# Patient Record
Sex: FEMALE | Race: WHITE | Employment: PART TIME | URBAN - NONMETROPOLITAN AREA
[De-identification: names, ages, dates, MRNs, and addresses within clinical notes are randomized per-mention and may not be internally consistent; named-entity substitution may affect disease eponyms.]

---

## 2022-11-09 ENCOUNTER — APPOINTMENT (OUTPATIENT)
Dept: CT IMAGING | Age: 32
DRG: 816 | End: 2022-11-09
Payer: MEDICAID

## 2022-11-09 ENCOUNTER — APPOINTMENT (OUTPATIENT)
Dept: GENERAL RADIOLOGY | Age: 32
DRG: 816 | End: 2022-11-09
Payer: MEDICAID

## 2022-11-09 ENCOUNTER — HOSPITAL ENCOUNTER (INPATIENT)
Age: 32
LOS: 2 days | Discharge: HOME | DRG: 816 | End: 2022-11-11
Attending: EMERGENCY MEDICINE | Admitting: INTERNAL MEDICINE
Payer: MEDICAID

## 2022-11-09 DIAGNOSIS — F19.10 POLYSUBSTANCE ABUSE (HCC): ICD-10-CM

## 2022-11-09 DIAGNOSIS — T40.605A NARCOTIC-INDUCED RESPIRATORY DEPRESSION: Primary | ICD-10-CM

## 2022-11-09 DIAGNOSIS — R06.89 NARCOTIC-INDUCED RESPIRATORY DEPRESSION: Primary | ICD-10-CM

## 2022-11-09 DIAGNOSIS — D72.829 LEUKOCYTOSIS, UNSPECIFIED TYPE: ICD-10-CM

## 2022-11-09 DIAGNOSIS — R79.89 INCREASED AMMONIA LEVEL: ICD-10-CM

## 2022-11-09 PROBLEM — R53.83 LETHARGY: Status: ACTIVE | Noted: 2022-11-09

## 2022-11-09 PROBLEM — T40.2X1A OPIOID OVERDOSE, ACCIDENTAL OR UNINTENTIONAL, INITIAL ENCOUNTER (HCC): Status: ACTIVE | Noted: 2022-11-09

## 2022-11-09 LAB
ACETAMINOPHEN LEVEL: < 5 UG/ML (ref 0–20)
ALBUMIN SERPL-MCNC: 4.5 G/DL (ref 3.5–5.1)
ALLEN TEST: POSITIVE
ALP BLD-CCNC: 89 U/L (ref 38–126)
ALT SERPL-CCNC: 16 U/L (ref 11–66)
AMMONIA: 70 UMOL/L (ref 11–60)
AMPHETAMINE+METHAMPHETAMINE URINE SCREEN: NEGATIVE
ANION GAP SERPL CALCULATED.3IONS-SCNC: 11 MEQ/L (ref 8–16)
APTT: 28.9 SECONDS (ref 22–38)
AST SERPL-CCNC: 30 U/L (ref 5–40)
BARBITURATE QUANTITATIVE URINE: NEGATIVE
BASE EXCESS (CALCULATED): -3.4 MMOL/L (ref -2.5–2.5)
BASE EXCESS MIXED: -1.3 MMOL/L (ref -2–3)
BASOPHILS # BLD: 0.3 %
BASOPHILS ABSOLUTE: 0.1 THOU/MM3 (ref 0–0.1)
BENZODIAZEPINE QUANTITATIVE URINE: NEGATIVE
BILIRUB SERPL-MCNC: 0.4 MG/DL (ref 0.3–1.2)
BUN BLDV-MCNC: 10 MG/DL (ref 7–22)
CALCIUM SERPL-MCNC: 9.6 MG/DL (ref 8.5–10.5)
CANNABINOID QUANTITATIVE URINE: POSITIVE
CHARACTER, CSF: CLEAR
CHLORIDE BLD-SCNC: 97 MEQ/L (ref 98–111)
CO2: 28 MEQ/L (ref 23–33)
COCAINE METABOLITE QUANTITATIVE URINE: POSITIVE
COLLECTED BY:: ABNORMAL
COLLECTED BY:: ABNORMAL
COLOR CSF: COLORLESS
CREAT SERPL-MCNC: 0.7 MG/DL (ref 0.4–1.2)
CRYPTOCOCCUS NEOFORMANS/GATTI CSF FILM ARR.: NOT DETECTED
CYTOMEGALOVIRUS (CMV) CSF FILM ARRAY: NOT DETECTED
DEVICE: ABNORMAL
DEVICE: ABNORMAL
EKG ATRIAL RATE: 94 BPM
EKG P AXIS: 63 DEGREES
EKG P-R INTERVAL: 134 MS
EKG Q-T INTERVAL: 380 MS
EKG QRS DURATION: 84 MS
EKG QTC CALCULATION (BAZETT): 475 MS
EKG R AXIS: 38 DEGREES
EKG T AXIS: 52 DEGREES
EKG VENTRICULAR RATE: 94 BPM
ENTEROVIRUS DETECTION PCR: NOT DETECTED
EOSINOPHIL # BLD: 0.5 %
EOSINOPHILS ABSOLUTE: 0.2 THOU/MM3 (ref 0–0.4)
ERYTHROCYTE [DISTWIDTH] IN BLOOD BY AUTOMATED COUNT: 12.4 % (ref 11.5–14.5)
ERYTHROCYTE [DISTWIDTH] IN BLOOD BY AUTOMATED COUNT: 44.6 FL (ref 35–45)
ESCHERICHIA COLI K1 CSF FILM ARRAY: NOT DETECTED
ETHYL ALCOHOL, SERUM: < 0.01 %
FENTANYL: POSITIVE
FIO2, MIXED VENOUS: 1
GFR SERPL CREATININE-BSD FRML MDRD: > 60 ML/MIN/1.73M2
GLUCOSE BLD-MCNC: 159 MG/DL (ref 70–108)
GLUCOSE BLD-MCNC: 173 MG/DL (ref 70–108)
GLUCOSE, CSF: 69 MG/DL (ref 40–80)
GRAM STAIN RESULT: NORMAL
HAEMOPHILUS INFLUENZA CSF FILM ARRAY: NOT DETECTED
HCO3, MIXED: 24 MMOL/L (ref 23–28)
HCO3: 24 MMOL/L (ref 23–28)
HCT VFR BLD CALC: 38.4 % (ref 37–47)
HEMOGLOBIN: 12.6 GM/DL (ref 12–16)
HHV-6 (HERPESVIRUS 6) CSF FILM ARRAY: NOT DETECTED
HSV-1 CSF FILM ARRAY: NOT DETECTED
HSV-2 CSF FILM ARRAY: NOT DETECTED
IFIO2: 15
IMMATURE GRANS (ABS): 0.18 THOU/MM3 (ref 0–0.07)
IMMATURE GRANULOCYTES: 0.6 %
INR BLD: 0.92 (ref 0.85–1.13)
LACTIC ACID: 0.6 MMOL/L (ref 0.5–2)
LACTIC ACID: 2.3 MMOL/L (ref 0.5–2)
LISTERIA MONOCYTOGENES CSF FILM ARRAY: NOT DETECTED
LYMPHOCYTES # BLD: 32.9 %
LYMPHOCYTES ABSOLUTE: 9.9 THOU/MM3 (ref 1–4.8)
LYMPHS CSF: 80 % (ref 0–90)
MCH RBC QN AUTO: 32.3 PG (ref 26–33)
MCHC RBC AUTO-ENTMCNC: 32.8 GM/DL (ref 32.2–35.5)
MCV RBC AUTO: 98.5 FL (ref 81–99)
MONOCYTE, CSF: 20 % (ref 0–45)
MONOCYTES # BLD: 7.3 %
MONOCYTES ABSOLUTE: 2.2 THOU/MM3 (ref 0.4–1.3)
NEISSERIA MENIGITIDIS CSF FILM ARRAY: NOT DETECTED
NUCLEATED RED BLOOD CELLS: 0 /100 WBC
O2 SAT, MIXED: 95 %
O2 SATURATION: 100 %
OPIATES, URINE: NEGATIVE
OSMOLALITY CALCULATION: 274.4 MOSMOL/KG (ref 275–300)
OXYCODONE: NEGATIVE
PARECHOVIRUS CSF FILM ARRAY: NOT DETECTED
PATHOLOGIST REVIEW: ABNORMAL
PATHOLOGIST REVIEW: NORMAL
PCO2, MIXED VENOUS: 44 MMHG (ref 41–51)
PCO2: 54 MMHG (ref 35–45)
PH BLOOD GAS: 7.26 (ref 7.35–7.45)
PH, MIXED: 7.36 (ref 7.31–7.41)
PHENCYCLIDINE QUANTITATIVE URINE: NEGATIVE
PLATELET # BLD: 468 THOU/MM3 (ref 130–400)
PMV BLD AUTO: 10.9 FL (ref 9.4–12.4)
PO2 MIXED: 82 MMHG (ref 25–40)
PO2: 275 MMHG (ref 71–104)
POTASSIUM SERPL-SCNC: 4.1 MEQ/L (ref 3.5–5.2)
PREGNANCY, SERUM: NEGATIVE
PRO-BNP: 485 PG/ML (ref 0–450)
PROCALCITONIN: 0.06 NG/ML (ref 0.01–0.09)
PROTEIN CSF: 28 MG/DL (ref 12–60)
RBC # BLD: 3.9 MILL/MM3 (ref 4.2–5.4)
RBC CSF: 1 /CUMM
SALICYLATE, SERUM: < 0.3 MG/DL (ref 2–10)
SCAN OF BLOOD SMEAR: NORMAL
SEG NEUTROPHILS: 58.4 %
SEGMENTED NEUTROPHILS ABSOLUTE COUNT: 17.6 THOU/MM3 (ref 1.8–7.7)
SODIUM BLD-SCNC: 136 MEQ/L (ref 135–145)
SOURCE, BLOOD GAS: ABNORMAL
STREPTOCOCCUS AGALACTIAE CSF FILM ARRAY: NOT DETECTED
STREPTOCOCCUS PNEUMONIAE CSF FILM ARRAY: NOT DETECTED
TOTAL CK: 146 U/L (ref 30–135)
TOTAL NUCLEATED CELLS CSF: 1 /CUMM (ref 0–5)
TOTAL PROTEIN: 7.3 G/DL (ref 6.1–8)
TROPONIN T: < 0.01 NG/ML
TSH SERPL DL<=0.05 MIU/L-ACNC: 2.23 UIU/ML (ref 0.4–4.2)
TUBE VOLUME RECEIVED CSF: 1 ML
VARICELLA-ZOSTER, PCR: NOT DETECTED
WBC # BLD: 30.1 THOU/MM3 (ref 4.8–10.8)

## 2022-11-09 PROCEDURE — 89051 BODY FLUID CELL COUNT: CPT

## 2022-11-09 PROCEDURE — 82077 ASSAY SPEC XCP UR&BREATH IA: CPT

## 2022-11-09 PROCEDURE — 96365 THER/PROPH/DIAG IV INF INIT: CPT

## 2022-11-09 PROCEDURE — 87040 BLOOD CULTURE FOR BACTERIA: CPT

## 2022-11-09 PROCEDURE — 70450 CT HEAD/BRAIN W/O DYE: CPT

## 2022-11-09 PROCEDURE — 83605 ASSAY OF LACTIC ACID: CPT

## 2022-11-09 PROCEDURE — 99285 EMERGENCY DEPT VISIT HI MDM: CPT

## 2022-11-09 PROCEDURE — 99223 1ST HOSP IP/OBS HIGH 75: CPT | Performed by: INTERNAL MEDICINE

## 2022-11-09 PROCEDURE — 6360000002 HC RX W HCPCS

## 2022-11-09 PROCEDURE — 87798 DETECT AGENT NOS DNA AMP: CPT

## 2022-11-09 PROCEDURE — 80053 COMPREHEN METABOLIC PANEL: CPT

## 2022-11-09 PROCEDURE — 87075 CULTR BACTERIA EXCEPT BLOOD: CPT

## 2022-11-09 PROCEDURE — 84443 ASSAY THYROID STIM HORMONE: CPT

## 2022-11-09 PROCEDURE — 84157 ASSAY OF PROTEIN OTHER: CPT

## 2022-11-09 PROCEDURE — 93010 ELECTROCARDIOGRAM REPORT: CPT | Performed by: NUCLEAR MEDICINE

## 2022-11-09 PROCEDURE — 84703 CHORIONIC GONADOTROPIN ASSAY: CPT

## 2022-11-09 PROCEDURE — 36600 WITHDRAWAL OF ARTERIAL BLOOD: CPT

## 2022-11-09 PROCEDURE — 36415 COLL VENOUS BLD VENIPUNCTURE: CPT

## 2022-11-09 PROCEDURE — 80179 DRUG ASSAY SALICYLATE: CPT

## 2022-11-09 PROCEDURE — 6370000000 HC RX 637 (ALT 250 FOR IP): Performed by: STUDENT IN AN ORGANIZED HEALTH CARE EDUCATION/TRAINING PROGRAM

## 2022-11-09 PROCEDURE — 82945 GLUCOSE OTHER FLUID: CPT

## 2022-11-09 PROCEDURE — 84145 PROCALCITONIN (PCT): CPT

## 2022-11-09 PROCEDURE — 93005 ELECTROCARDIOGRAM TRACING: CPT | Performed by: EMERGENCY MEDICINE

## 2022-11-09 PROCEDURE — 84484 ASSAY OF TROPONIN QUANT: CPT

## 2022-11-09 PROCEDURE — 83880 ASSAY OF NATRIURETIC PEPTIDE: CPT

## 2022-11-09 PROCEDURE — 6360000002 HC RX W HCPCS: Performed by: STUDENT IN AN ORGANIZED HEALTH CARE EDUCATION/TRAINING PROGRAM

## 2022-11-09 PROCEDURE — 72125 CT NECK SPINE W/O DYE: CPT

## 2022-11-09 PROCEDURE — 94761 N-INVAS EAR/PLS OXIMETRY MLT: CPT

## 2022-11-09 PROCEDURE — 6360000002 HC RX W HCPCS: Performed by: EMERGENCY MEDICINE

## 2022-11-09 PROCEDURE — 80307 DRUG TEST PRSMV CHEM ANLYZR: CPT

## 2022-11-09 PROCEDURE — 96375 TX/PRO/DX INJ NEW DRUG ADDON: CPT

## 2022-11-09 PROCEDURE — 009U3ZZ DRAINAGE OF SPINAL CANAL, PERCUTANEOUS APPROACH: ICD-10-PCS | Performed by: INTERNAL MEDICINE

## 2022-11-09 PROCEDURE — 6370000000 HC RX 637 (ALT 250 FOR IP): Performed by: EMERGENCY MEDICINE

## 2022-11-09 PROCEDURE — 2580000003 HC RX 258: Performed by: STUDENT IN AN ORGANIZED HEALTH CARE EDUCATION/TRAINING PROGRAM

## 2022-11-09 PROCEDURE — 85610 PROTHROMBIN TIME: CPT

## 2022-11-09 PROCEDURE — 82803 BLOOD GASES ANY COMBINATION: CPT

## 2022-11-09 PROCEDURE — 80143 DRUG ASSAY ACETAMINOPHEN: CPT

## 2022-11-09 PROCEDURE — 85025 COMPLETE CBC W/AUTO DIFF WBC: CPT

## 2022-11-09 PROCEDURE — 82948 REAGENT STRIP/BLOOD GLUCOSE: CPT

## 2022-11-09 PROCEDURE — 2580000003 HC RX 258: Performed by: EMERGENCY MEDICINE

## 2022-11-09 PROCEDURE — 2140000000 HC CCU INTERMEDIATE R&B

## 2022-11-09 PROCEDURE — 62270 DX LMBR SPI PNXR: CPT

## 2022-11-09 PROCEDURE — 87205 SMEAR GRAM STAIN: CPT

## 2022-11-09 PROCEDURE — 2700000000 HC OXYGEN THERAPY PER DAY

## 2022-11-09 PROCEDURE — 85730 THROMBOPLASTIN TIME PARTIAL: CPT

## 2022-11-09 PROCEDURE — 82550 ASSAY OF CK (CPK): CPT

## 2022-11-09 PROCEDURE — 88108 CYTOPATH CONCENTRATE TECH: CPT

## 2022-11-09 PROCEDURE — 82140 ASSAY OF AMMONIA: CPT

## 2022-11-09 PROCEDURE — 71045 X-RAY EXAM CHEST 1 VIEW: CPT

## 2022-11-09 RX ORDER — POLYETHYLENE GLYCOL 3350 17 G/17G
17 POWDER, FOR SOLUTION ORAL DAILY PRN
Status: DISCONTINUED | OUTPATIENT
Start: 2022-11-09 | End: 2022-11-11 | Stop reason: HOSPADM

## 2022-11-09 RX ORDER — BUPRENORPHINE AND NALOXONE 8; 2 MG/1; MG/1
1 FILM, SOLUBLE BUCCAL; SUBLINGUAL DAILY
COMMUNITY

## 2022-11-09 RX ORDER — CLONIDINE HYDROCHLORIDE 0.1 MG/1
0.1 TABLET ORAL EVERY 6 HOURS PRN
Status: DISCONTINUED | OUTPATIENT
Start: 2022-11-09 | End: 2022-11-11 | Stop reason: HOSPADM

## 2022-11-09 RX ORDER — NALOXONE HYDROCHLORIDE 1 MG/ML
2 INJECTION INTRAMUSCULAR; INTRAVENOUS; SUBCUTANEOUS ONCE
Status: COMPLETED | OUTPATIENT
Start: 2022-11-09 | End: 2022-11-09

## 2022-11-09 RX ORDER — METHOCARBAMOL 500 MG/1
750 TABLET, FILM COATED ORAL EVERY 6 HOURS PRN
Status: DISCONTINUED | OUTPATIENT
Start: 2022-11-09 | End: 2022-11-11 | Stop reason: HOSPADM

## 2022-11-09 RX ORDER — LOPERAMIDE HYDROCHLORIDE 2 MG/1
2 CAPSULE ORAL 4 TIMES DAILY PRN
Status: DISCONTINUED | OUTPATIENT
Start: 2022-11-09 | End: 2022-11-11 | Stop reason: HOSPADM

## 2022-11-09 RX ORDER — ONDANSETRON 4 MG/1
4 TABLET, ORALLY DISINTEGRATING ORAL EVERY 8 HOURS PRN
Status: DISCONTINUED | OUTPATIENT
Start: 2022-11-09 | End: 2022-11-11 | Stop reason: HOSPADM

## 2022-11-09 RX ORDER — SODIUM CHLORIDE 0.9 % (FLUSH) 0.9 %
5-40 SYRINGE (ML) INJECTION PRN
Status: DISCONTINUED | OUTPATIENT
Start: 2022-11-09 | End: 2022-11-09 | Stop reason: SDUPTHER

## 2022-11-09 RX ORDER — SODIUM CHLORIDE 9 MG/ML
INJECTION, SOLUTION INTRAVENOUS PRN
Status: DISCONTINUED | OUTPATIENT
Start: 2022-11-09 | End: 2022-11-11 | Stop reason: HOSPADM

## 2022-11-09 RX ORDER — ENOXAPARIN SODIUM 100 MG/ML
40 INJECTION SUBCUTANEOUS EVERY 24 HOURS
Status: DISCONTINUED | OUTPATIENT
Start: 2022-11-09 | End: 2022-11-11 | Stop reason: HOSPADM

## 2022-11-09 RX ORDER — DIPHENHYDRAMINE HYDROCHLORIDE 50 MG/ML
25 INJECTION INTRAMUSCULAR; INTRAVENOUS ONCE
Status: COMPLETED | OUTPATIENT
Start: 2022-11-09 | End: 2022-11-09

## 2022-11-09 RX ORDER — SODIUM CHLORIDE 0.9 % (FLUSH) 0.9 %
5-40 SYRINGE (ML) INJECTION EVERY 12 HOURS SCHEDULED
Status: DISCONTINUED | OUTPATIENT
Start: 2022-11-09 | End: 2022-11-09 | Stop reason: SDUPTHER

## 2022-11-09 RX ORDER — IBUPROFEN 800 MG/1
800 TABLET ORAL EVERY 8 HOURS PRN
Status: DISCONTINUED | OUTPATIENT
Start: 2022-11-09 | End: 2022-11-11 | Stop reason: HOSPADM

## 2022-11-09 RX ORDER — DIPHENHYDRAMINE HYDROCHLORIDE 50 MG/ML
INJECTION INTRAMUSCULAR; INTRAVENOUS
Status: COMPLETED
Start: 2022-11-09 | End: 2022-11-09

## 2022-11-09 RX ORDER — ONDANSETRON 2 MG/ML
4 INJECTION INTRAMUSCULAR; INTRAVENOUS EVERY 6 HOURS PRN
Status: DISCONTINUED | OUTPATIENT
Start: 2022-11-09 | End: 2022-11-11 | Stop reason: HOSPADM

## 2022-11-09 RX ORDER — SODIUM CHLORIDE 0.9 % (FLUSH) 0.9 %
5-40 SYRINGE (ML) INJECTION PRN
Status: DISCONTINUED | OUTPATIENT
Start: 2022-11-09 | End: 2022-11-11 | Stop reason: HOSPADM

## 2022-11-09 RX ORDER — HYDROXYZINE PAMOATE 25 MG/1
50 CAPSULE ORAL EVERY 8 HOURS PRN
Status: DISCONTINUED | OUTPATIENT
Start: 2022-11-09 | End: 2022-11-11 | Stop reason: HOSPADM

## 2022-11-09 RX ORDER — ACETAMINOPHEN 325 MG/1
650 TABLET ORAL EVERY 6 HOURS PRN
Status: DISCONTINUED | OUTPATIENT
Start: 2022-11-09 | End: 2022-11-11 | Stop reason: HOSPADM

## 2022-11-09 RX ORDER — ACETAMINOPHEN 650 MG/1
650 SUPPOSITORY RECTAL EVERY 6 HOURS PRN
Status: DISCONTINUED | OUTPATIENT
Start: 2022-11-09 | End: 2022-11-11 | Stop reason: HOSPADM

## 2022-11-09 RX ORDER — LANOLIN ALCOHOL/MO/W.PET/CERES
3 CREAM (GRAM) TOPICAL NIGHTLY
Status: DISCONTINUED | OUTPATIENT
Start: 2022-11-09 | End: 2022-11-11 | Stop reason: HOSPADM

## 2022-11-09 RX ORDER — SODIUM CHLORIDE 0.9 % (FLUSH) 0.9 %
5-40 SYRINGE (ML) INJECTION EVERY 12 HOURS SCHEDULED
Status: DISCONTINUED | OUTPATIENT
Start: 2022-11-09 | End: 2022-11-11 | Stop reason: HOSPADM

## 2022-11-09 RX ORDER — LACTULOSE 10 G/15ML
20 SOLUTION ORAL ONCE
Status: COMPLETED | OUTPATIENT
Start: 2022-11-09 | End: 2022-11-09

## 2022-11-09 RX ORDER — DICYCLOMINE HYDROCHLORIDE 10 MG/1
20 CAPSULE ORAL EVERY 6 HOURS PRN
Status: DISCONTINUED | OUTPATIENT
Start: 2022-11-09 | End: 2022-11-11 | Stop reason: HOSPADM

## 2022-11-09 RX ORDER — 0.9 % SODIUM CHLORIDE 0.9 %
2000 INTRAVENOUS SOLUTION INTRAVENOUS ONCE
Status: COMPLETED | OUTPATIENT
Start: 2022-11-09 | End: 2022-11-09

## 2022-11-09 RX ADMIN — PIPERACILLIN AND TAZOBACTAM 3375 MG: 3; .375 INJECTION, POWDER, FOR SOLUTION INTRAVENOUS at 22:02

## 2022-11-09 RX ADMIN — VANCOMYCIN HYDROCHLORIDE 1000 MG: 1 INJECTION, POWDER, LYOPHILIZED, FOR SOLUTION INTRAVENOUS at 14:09

## 2022-11-09 RX ADMIN — ACETAMINOPHEN 650 MG: 325 TABLET ORAL at 20:43

## 2022-11-09 RX ADMIN — NALOXONE HYDROCHLORIDE 0.4 MG/HR: 1 INJECTION PARENTERAL at 23:23

## 2022-11-09 RX ADMIN — CEFTRIAXONE 2000 MG: 2 INJECTION, POWDER, FOR SOLUTION INTRAMUSCULAR; INTRAVENOUS at 14:09

## 2022-11-09 RX ADMIN — ENOXAPARIN SODIUM 40 MG: 100 INJECTION SUBCUTANEOUS at 20:43

## 2022-11-09 RX ADMIN — SODIUM CHLORIDE 2000 ML: 9 INJECTION, SOLUTION INTRAVENOUS at 11:16

## 2022-11-09 RX ADMIN — NALOXONE HYDROCHLORIDE 2 MG: 1 INJECTION PARENTERAL at 11:25

## 2022-11-09 RX ADMIN — LACTULOSE 20 G: 20 SOLUTION ORAL at 15:42

## 2022-11-09 RX ADMIN — SODIUM CHLORIDE, PRESERVATIVE FREE 10 ML: 5 INJECTION INTRAVENOUS at 20:44

## 2022-11-09 RX ADMIN — Medication 3 MG: at 20:43

## 2022-11-09 RX ADMIN — DIPHENHYDRAMINE HYDROCHLORIDE 25 MG: 50 INJECTION INTRAMUSCULAR; INTRAVENOUS at 11:34

## 2022-11-09 RX ADMIN — DIPHENHYDRAMINE HYDROCHLORIDE 25 MG: 50 INJECTION, SOLUTION INTRAMUSCULAR; INTRAVENOUS at 11:34

## 2022-11-09 RX ADMIN — NALOXONE HYDROCHLORIDE 0.4 MG/HR: 1 INJECTION PARENTERAL at 18:15

## 2022-11-09 ASSESSMENT — PAIN - FUNCTIONAL ASSESSMENT
PAIN_FUNCTIONAL_ASSESSMENT: NONE - DENIES PAIN

## 2022-11-09 NOTE — ED NOTES
Fluids started. Dr Marcus Mendoza at bedside. Pt in and out of consciousness while talking with Dr Marcus Mendoza.  Verbal order for 2mg Narcan IV     Bowen Sanchez RN  11/09/22 1529

## 2022-11-09 NOTE — ED NOTES
Pt being bagged by Dr Luz Marina Abbott at this time, 92%     [facility]  11/09/22 1038

## 2022-11-09 NOTE — PROGRESS NOTES
Patient arrived per cart to 3B. Heart monitor applied and vitals taken. Admission paperwork completed. Explained to patient that [facility] is not responsible for any lost or stolen items. Patient verbalized understanding. Oriented to room and use of call light and bed controls. Patient denies pain or needs. No signs of distress noted. Bed locked & in low position, side-rails up x2. Call light in reach. Reminded patient to call nurse if any needs arise. 2 person skin assessment performed by this nurse and [address] [place] [address].

## 2022-11-09 NOTE — ED PROVIDER NOTES
[facility] [address] [facility] ENCOUNTER      PATIENT NAME: Mundo Garcia  MRN: [id number]  : 1990  ADAN: 2022  PROVIDER: Nela Cox MD      CHIEF COMPLAINT       Chief Complaint   Patient presents with    Drug Overdose       Patient is seen and evaluated in a timely fashion. Nurses Notes are reviewed and I agree except as noted in the HPI. HISTORY OF PRESENT ILLNESS    Mundo Garcia is a 36 y.o. female who presents to Emergency Department with Drug Overdose     Patient presents ED for evaluation of unresponsiveness. Patient was put in the wheel chair from front door, no other people were at the bedside. SaO2 initially was reported at 2% RA. Patient was cyanotic. She had pulses. She was given Narcan 2 mg IV right away. SaO2 was 17% with good pleth. Patient was given bag mask ventilation for about 2 minutes, SaO2 gradually climbed up to100%. Patient's fiancé later arrived stating patient was admitted around 8 PM at [facility] in [facility] due to OD with Cocaine and Fentanyl and was discharged about 1 AM this morning. When patient went home, they decide to drive down to [facility] [place] where patient had family member. On the way to [facility], patient became unresponsive and he had to stop by at our ED. She has no fever or chills. She has been having neck pain. She has no neck stiffness. She has no cough. She has no nausea or vomiting. No abdominal pain. No urinary symptoms. Past medical history remarkable for cocaine and opiate abuse. This HPI was provided by patient. REVIEW OF SYSTEMS   Ten-point review of systems is negative except those documented in above HPI including constitutional, HEENT, respiratory, cardiovascular, gastrointestinal, genitourinary, musculoskeletal, skin, neurological, hematological and behavioral.      PAST MEDICAL HISTORY    has no past medical history on file.     SURGICAL HISTORY      has no past surgical history on file. CURRENT MEDICATIONS       Previous Medications    No medications on file       ALLERGIES     has No Known Allergies. FAMILY HISTORY     has no family status information on file. family history is not on file. SOCIAL HISTORY          PHYSICAL EXAM      blood pressure is 100/64 and her pulse is 76. Her respiration is 16 and oxygen saturation is 95%. Physical Exam  Vitals and nursing note reviewed. Constitutional:       Appearance: She is well-developed. She is not diaphoretic. HENT:      Head: Normocephalic and atraumatic. Nose: Nose normal.   Eyes:      General: No scleral icterus. Right eye: No discharge. Left eye: No discharge. Conjunctiva/sclera: Conjunctivae normal.      Pupils: Pupils are equal, round, and reactive to light. Neck:      Vascular: No JVD. Trachea: No tracheal deviation. Cardiovascular:      Rate and Rhythm: Normal rate and regular rhythm. Heart sounds: Normal heart sounds. No murmur heard. No friction rub. No gallop. Pulmonary:      Effort: Pulmonary effort is normal. No respiratory distress. Breath sounds: Normal breath sounds. No stridor. No wheezing or rales. Chest:      Chest wall: No tenderness. Abdominal:      General: Bowel sounds are normal. There is no distension. Palpations: Abdomen is soft. There is no mass. Tenderness: There is no abdominal tenderness. There is no guarding or rebound. Hernia: No hernia is present. Musculoskeletal:         General: No tenderness or deformity. Cervical back: Normal range of motion and neck supple. Lymphadenopathy:      Cervical: No cervical adenopathy. Skin:     General: Skin is warm and dry. Capillary Refill: Capillary refill takes less than 2 seconds. Coloration: Skin is not pale. Findings: No erythema or rash. Neurological:      Mental Status: She is alert and oriented to person, place, and time. Cranial Nerves:  No cranial nerve deficit. Sensory: No sensory deficit. Motor: No abnormal muscle tone. Coordination: Coordination normal.      Deep Tendon Reflexes: Reflexes normal.   Psychiatric:         Behavior: Behavior normal.         Thought Content: Thought content normal.         Judgment: Judgment normal.     ANCILLARY TEST RESULTS   EKG:    Interpreted by me  Normal sinus rhythm, ventricular rate 94 bpm, MI interval 134 ms, QRS duration 84 ms,  ms, no ST elevation or acute T wave    LAB RESULTS:  Results for orders placed or performed during the hospital encounter of 11/09/22   Blood Gas, Arterial   Result Value Ref Range    pH, Blood Gas 7.26 (L) 7.35 - 7.45    PCO2 54 (H) 35 - 45 mmhg    PO2 275 (H) 71 - 104 mmhg    HCO3 24 23 - 28 mmol/l    Base Excess (Calculated) -3.4 (L) -2.5 - 2.5 mmol/l    O2 Sat 100 %    IFIO2 15     DEVICE NRB     Parth Test Positive     Source: R Radial     COLLECTED BY: [id number]    CBC with Auto Differential   Result Value Ref Range    WBC 30.1 (HH) 4.8 - 10.8 thou/mm3    RBC 3.90 (L) 4.20 - 5.40 mill/mm3    Hemoglobin 12.6 12.0 - 16.0 gm/dl    Hematocrit 38.4 37.0 - 47.0 %    MCV 98.5 81.0 - 99.0 fL    MCH 32.3 26.0 - 33.0 pg    MCHC 32.8 32.2 - 35.5 gm/dl    RDW-CV 12.4 11.5 - 14.5 %    RDW-SD 44.6 35.0 - 45.0 fL    Platelets 840 (H) 613 - 400 thou/mm3    MPV 10.9 9.4 - 12.4 fL   Comprehensive Metabolic Panel   Result Value Ref Range    Glucose 159 (H) 70 - 108 mg/dL    Creatinine 0.7 0.4 - 1.2 mg/dL    BUN 10 7 - 22 mg/dL    Sodium 136 135 - 145 meq/L    Potassium 4.1 3.5 - 5.2 meq/L    Chloride 97 (L) 98 - 111 meq/L    CO2 28 23 - 33 meq/L    Calcium 9.6 8.5 - 10.5 mg/dL    AST 30 5 - 40 U/L    Alkaline Phosphatase 89 38 - 126 U/L    Total Protein 7.3 6.1 - 8.0 g/dL    Albumin 4.5 3.5 - 5.1 g/dL    Total Bilirubin 0.4 0.3 - 1.2 mg/dL    ALT 16 11 - 66 U/L   Troponin   Result Value Ref Range    Troponin T < 0.010 ng/ml   Brain Natriuretic Peptide   Result Value Ref Range Pro-.0 (H) 0.0 - 450.0 pg/mL   Ammonia   Result Value Ref Range    Ammonia 70 (H) 11 - 60 umol/L   Protime-INR   Result Value Ref Range    INR 0.92 0.85 - 1.13   APTT   Result Value Ref Range    aPTT 28.9 22.0 - 38.0 seconds   Lactic Acid   Result Value Ref Range    Lactic Acid 2.3 (H) 0.5 - 2.0 mmol/L   CK   Result Value Ref Range    Total  (H) 30 - 135 U/L   Ethanol   Result Value Ref Range    ETHYL ALCOHOL, SERUM < 0.01 0.00 %   Acetaminophen Level   Result Value Ref Range    Acetaminophen Level < 5.0 0.0 - 57.5 ug/mL   Salicylate   Result Value Ref Range    Salicylate, Serum < 0.3 (L) 2.0 - 10.0 mg/dL   HCG Qualitative, Serum   Result Value Ref Range    Preg, Serum NEGATIVE NEGATIVE   Urine Drug Screen   Result Value Ref Range    Amphetamine+Methamphetamine Urine Screen Negative NEGATIVE    Barbiturate Quant, Ur Negative NEGATIVE    Benzodiazepine Quant, Ur Negative NEGATIVE    Cannabinoid Quant, Ur POSITIVE NEGATIVE    Cocaine Metab Quant, Ur POSITIVE NEGATIVE    Opiates, Urine Negative NEGATIVE    Oxycodone Negative NEGATIVE    PCP Quant, Ur Negative NEGATIVE    Fentanyl POSITIVE NEGATIVE   Procalcitonin   Result Value Ref Range    Procalcitonin 0.06 0.01 - 0.09 ng/mL   Glomerular Filtration Rate, Estimated   Result Value Ref Range    Est, Glom Filt Rate >60 >60 ml/min/1.73m2   Anion Gap   Result Value Ref Range    Anion Gap 11.0 8.0 - 16.0 meq/L   Osmolality   Result Value Ref Range    Osmolality Calc 274.4 (L) 275.0 - 300.0 mOsmol/kg   Scan of Blood Smear   Result Value Ref Range    SCAN OF BLOOD SMEAR see below    POCT Glucose   Result Value Ref Range    POC Glucose 173 (H) 70 - 108 mg/dl   EKG 12 Lead   Result Value Ref Range    Ventricular Rate 94 BPM    Atrial Rate 94 BPM    P-R Interval 134 ms    QRS Duration 84 ms    Q-T Interval 380 ms    QTc Calculation (Bazett) 475 ms    P Axis 63 degrees    R Axis 38 degrees    T Axis 52 degrees       RADIOLOGY REPORTS  CT HEAD WO CONTRAST   Final Result   No acute intracranial process is visualized. **This report has been created using voice recognition software. It may contain minor errors which are inherent in voice recognition technology. **      Final report electronically signed by Dr Nanci Nick on 11/9/2022 12:02 PM      CT CERVICAL SPINE WO CONTRAST   Final Result   No fracture or acute bony abnormality visualized. Straightening of the normal cervical alignment either due to patient positioning versus muscular spasm. Centrilobular emphysema noted at the lung apices. **This report has been created using voice recognition software. It may contain minor errors which are inherent in voice recognition technology. **      Final report electronically signed by Dr Nanci Nick on 11/9/2022 12:04 PM      XR CHEST PORTABLE   Final Result   There is no acute intrathoracic process. **This report has been created using voice recognition software. It may contain minor errors which are inherent in voice recognition technology. **      Final report electronically signed by Dr Dana Lorenzo on 11/9/2022 11:09 AM          [facility] AND ED COURSE   Patient is seen and evaluated at 10:55 AM EST. DDx: Respiratory arrest, opiate overdose, respiratory acidosis, substance abuse    She was given bag mask ventilation for 2 minutes, SaO2 improved from 17% to 100%. Patient regained spontaneous breathing. Patient however became lethargic again in ED  a second dose of IV Narcan 2mg was given and caused mild withdrawal symptoms which improved after 20-30 minutes. .    On reassessment, patient was alert and oriented x 4. Still looked tired by SaO2 around 96% on 2L/min NC    Labs were reviewed, WBC 30, ammonia 70. UDS showed positive for cocaine and fentanyl. Otherwise labs were reassuring. Given that patient's complained neck pain, LP was performed, CSF was clear.   Empirically patient received IV Rocephin and vancomycin. Plan to initiate Narcan drip but patient was reluctant for that in ED. CTs head and cervical spine did not show acute abnormality. Chest x-ray had no acute changes, no pulmonary edema. Consult  Hospitalist    Procedures  Lumbar Puncture Procedure Note    Indication: Suspected meningitis    Consent: The patient provided verbal consent for this procedure. Procedure: The patient was placed in the sitting, supported by bed side stand, position and the appropriate landmarks were identified. The area was prepped and draped in the usual sterile fashion. Anesthesia was obtained using 2 cc of 1% Lidocaine without epinephrine. A spinal needle was inserted at the L4- L5 level with the stylet in place until spinal fluid was returned. Opening pressure was not measured. At this point 4.0 cc of clear cerebral spinal fluid was obtained and sent for appropriate testing. The stylet was then replaced and the needle was withdrawn. A sterile dressing was placed over the site and the patient was placed in the supine position. The patient tolerated the procedure well.     Complications: None        Medications   cefTRIAXone (ROCEPHIN) 2,000 mg in dextrose 5 % 50 mL IVPB mini-bag (2,000 mg IntraVENous New Bag 11/9/22 1409)   vancomycin (VANCOCIN) 1,000 mg in sodium chloride 0.9 % 250 mL IVPB ([id number]) (1,000 mg IntraVENous New Bag 11/9/22 1409)   sodium chloride flush 0.9 % injection 5-40 mL (has no administration in time range)   sodium chloride flush 0.9 % injection 5-40 mL (has no administration in time range)   0.9 % sodium chloride infusion (has no administration in time range)   lactulose (CHRONULAC) 10 GM/15ML solution 20 g (has no administration in time range)   0.9 % sodium chloride bolus (0 mLs IntraVENous Stopped 11/9/22 1336)   naloxone (NARCAN) injection 2 mg (2 mg IntraVENous Given 11/9/22 1125)   diphenhydrAMINE (BENADRYL) injection 25 mg (25 mg IntraVENous Given 11/9/22 1134)       Vitals: 11/09/22 1222 11/09/22 1334 11/09/22 1343 11/09/22 1411   BP: 109/73 104/69 102/68 100/64   Pulse: 90 93 80 76   Resp: 16 18 16 16   SpO2: 96% 95% 95% 95%       Critical Care: There was a high probability of clinically significant/life threatening deterioration in this patient's condition which required my urgent intervention. Total critical care time was 30 minutes. This excludes any time for separately reportable procedures. FINAL IMPRESSION AND DISPOSITION      1. Narcotic-induced respiratory depression    2. Leukocytosis, unspecified type    3. Increased ammonia level    4. Polysubstance abuse ([facility] [address]        Admit    PATIENT REFERRED TO:  No follow-up provider specified.   DISCHARGE MEDICATIONS:  New Prescriptions    No medications on file     (Please note that portions of this note were completed with a voice recognition program.  Efforts were made to edit the dictations but occasionally words aremis-transcribed.)  MD Susana Ruby MD  11/09/22 7486

## 2022-11-09 NOTE — ED NOTES
Pt medicated per MAR. No needs expressed at this time. Respirations even and unlabored, call light within reach.  [facility] [place]  11/09/22 9438

## 2022-11-09 NOTE — ED NOTES
Pt medicated per MAR. Updated on IP bed.  [address] [facility],  [address], [address] [facility]  11/09/22 1800

## 2022-11-09 NOTE — ED NOTES
Dr Rosa Pillai at bedside to perform lumbar puncture.       [facility]  11/09/22 8535

## 2022-11-09 NOTE — ED NOTES
Pt responsive to pain at this time. NRB placed on pt, 98%.   Pt noted to have good cough     [place], RN  11/09/22 1038

## 2022-11-09 NOTE — PLAN OF CARE
Hospitalist Update Note    Patient evaluated in the ED. patient apparently ingested a line of cocaine last night, and was found in the bathroom by her boyfriend to be unresponsive and underwent chest compressions and was subsequently taken to an ER in [place] (details unclear). She received 4 mg of Narcan and woke up and was apparently discharged, and was being taken to [place] by her boyfriend. She was found to be more drowsy during the trip and ultimately became unresponsive again and her boyfriend reports that he called 911 to find the nearest ER and gave her chest compressions but reported she was breathing intermittently. He states that he was able to drive her to the ER while giving intermittent compression. Here she was given Narcan with improvement in her alertness. She had an ABG drawn that shows a pH of 7.26, with a respiratory acidosis. She also had a lumbar puncture performed in the ED that appears to be thus far unremarkable. Her white count was noted to be 30,000 on arrival.  UDS was positive for cannabis, cocaine, fentanyl. CT head was negative. Given that this patient has now become unresponsive twice after a single ingestion, I have concerns that she will again become unresponsive. I discussed the case with ICU NP Miranda Nj, as I feel that she may need elective intubation and Narcan drip. She was evaluated by the ICU team in the ED, and felt that she was maintaining her airway appropriately and stable for admission to stepdown unit with close monitoring. Low threshold for transfer to ICU for elective intubation for airway protection if further decompensation despite Narcan drip. Narcan drip ordered, H&P to follow.

## 2022-11-09 NOTE — ED TRIAGE NOTES
Pt presents to ER through intake unresponsive. Pt placed in bed and brought to Rm 1. Pt placed on monitor, O2 17% on RA. 2 IVs established. Pt being suctioned at this time.  Provider at bedside

## 2022-11-09 NOTE — H&P
History & Physical       Patient: Saqib Bach  YOB: 1990    MRN: [id number]     Acct: [de-identified]    PCP: No primary care provider on file. Date of Admission: 11/9/2022    Date of Service: Patient seen / examined on 11/09/22 and admitted to Inpatient with expected LOS greater than two midnights due to medical therapy. ASSESSMENT / PLAN:  Opioid Overdose, accidental or unintentional: UDS+ Cocaine, Cannabinoids, Fentanyl. Uses 1g cocaine 2-3x/week per patient. Most recent use 9:30AM on 11/9/22 - thought it was cocaine, but may have contained fentanyl. S/p Narcan 2mg x1 in the ED. Continue Narcan gtt for now. Supportive care for opiate withdrawal symptoms. Continue Neuro checks q4hrs. Low threshold for ICU transfer if patient's mentation/respiratory status declines. Lactic Acidosis: No obvious infectious source. ?aspiration. Hypoxia likely contributing. S/p IVF bolus. S/p Rocephin and Vancomycin. Continue Zosyn q8hrs for broad coverage - de-escalate in 24-48hrs  if no obvious source found. Hyperammonemia: No history of liver disease. S/p Lactulose 20g x1 in ED. No need to recheck as long as patient's mentation improves with narcan gtt. Leukocytosis / Thrombocytosis: Suspect reactive, but no obvious source of infection. ? aspiration due to opiate overdose. S/p Rocephin and Vancomycin. Continue Zosyn q8hrs for broad coverage - de-escalate in 24-48hrs  if no obvious source found. Respiratory Acidosis: Suspect due to opiate/fentantyl overdose as above. S/p narcan. Continue Narcan gtt. Repeat VBG. Chief Complaint:  Unresponsive    History of Present Illness:  Patient is a 36year old  Female with PMH Drug Abuse. She presented to [facility] ED after being found unresponsive by her boyfriend while [place] [facility] [place]. Patient's sister was at bedside to provide some history as patient is somnolent and easily falls back asleep.  Per patient's sister, patient was recently admitted to a hospital at around [facility] at [facility] in [address] [place] for overdose with cocaine and fentanyl. She was discharged around 1am this morning. When patient went home patient's boyfriend decided to drive her to [facility] because her sister lived there and patient became unresponsive on the way there and thus presented to [facility] ED. Upon arrival patient was unresponsive with SpO2 17% on RA. She was given Narcan 2mg intranasally and bag masked. She soon became responsive to pain and placed on NRB now transitioned to nasal cannula. Patient states that her last use was around 9:30AM. She states she thought she snorted cocaine, but it may have had something else in it. She states she normally uses 1g a day 2-3 times a week. She otherwise uses cannabis. She currently denies any dizziness, lightheadedness, chest pain, shortness of breath, abdominal pain, nausea, vomiting, dysuria. Initial VS , RR 16, /100, SpO2 17% on RA (now 95% on 1L NC). Labs significant for Lactic acid 2.3, ammonia 70, WBC 30.1. UDS +Cocaine, Cannabinoids, and Fentanyl. LP performed in ED as well - PCR negative. ABG 7.26/54/275/24. CT Head and Cervical Spine negative for acute findings. CXR shows no acute findings. She was given 2L NS, Rocephin 2g x1, Vancomycin 1g x1, Benadryl 25mg x1, Lactulose 20g x1. Past Medical History:    No past medical history on file. Past Surgical History:    No past surgical history on file. Medications Prior to Admission:   No current facility-administered medications on file prior to encounter. No current outpatient medications on file prior to encounter. Allergies:   Patient has no known allergies.     Social History:   Social History     Socioeconomic History    Marital status: Single     Spouse name: Not on file    Number of children: Not on file    Years of education: Not on file    Highest education level: Not on file   Occupational History    Not on file   Tobacco Use Smoking status: Not on file    Smokeless tobacco: Not on file   Substance and Sexual Activity    Alcohol use: Not on file    Drug use: Not on file    Sexual activity: Not on file   Other Topics Concern    Not on file   Social History Narrative    Not on file     Social Determinants of Health     Financial Resource Strain: Not on file   Food Insecurity: Not on file   Transportation Needs: Not on file   Physical Activity: Not on file   Stress: Not on file   Social Connections: Not on file   Intimate Partner Violence: Not on file   Housing Stability: Not on file     Family History:    No family history on file. REVIEW OF SYSTEMS:  A 14-point ROS was obtained and negative, with the exception of pertinent positives as listed below:  +Lethargy    PHYSICAL EXAM:  Vitals:    11/09/22 1343 11/09/22 1411 11/09/22 1523 11/09/22 1543   BP: 102/68 100/64 110/72 103/69   Pulse: 80 76 81 90   Resp: 16 16 18 16   SpO2: 95% 95% 98% 95%     General appearance: Somnolent, chronically ill-appearing female. Cooperative. NAD. HEENT:  Normocephalic / atraumatic. PERRL. EOM intact. Conjunctivae appear normal.  Neck: Supple. No JVD. Respiratory: Normal respiratory effort on RA. CTAB. No wheezes / rales / rhonchi. Cardiovascular: RRR. Normal S1/S2. No murmurs / rubs / gallops. Abdomen: Soft / non-tender / non-distended. BS present. Musculoskeletal: No cyanosis or edema. Skin: Warm / dry. Normal turgor. Neurologic: A/O x 3. Speech normal. Answers questions appropriately. CN intact. No obvious focal neurologic deficits. Psychiatric: Thought content / judgment / insight appear poor  Capillary refill: Brisk bilaterally. Peripheral pulses: +2 bilaterally. Labs:   Results for orders placed or performed during the hospital encounter of 11/09/22   Gram stain CSF    Specimen: CSF   Result Value Ref Range    Gram Stain Result       No segmented neutrophils observed. No epithelial cells observed. No bacteria seen.    Meningitis Encephalitis Panel CSF, Molecular    Specimen: CSF   Result Value Ref Range    ESCHERICHIA COLI K1 CSF FILM ARRAY Not Detected Not Detected    HAEMOPHILUS INFLUENZA CSF FILM ARRAY Not Detected Not Detected    LISTERIA MONOCYTOGENES CSF FILM ARRAY Not Detected Not Detected    NEISSERIA MENIGITIDIS CSF FILM ARRAY Not Detected Not Detected    STREPTOCOCCUS AGALACTIAE CSF FILM ARRAY Not Detected Not Detected    STREPTOCOCCUS PNEUMONIAE CSF FILM ARRAY Not Detected Not Detected    CYTOMEGALOVIRUS (CMV) CSF FILM ARRAY Not Detected Not Detected    Enterovirus Detect PCR Not Detected Not Detected    HSV-1 CSF FILM ARRAY Not Detected Not Detected    HSV-2 CSF FILM ARRAY Not Detected Not Detected    HHV-6 (HERPESVIRUS 6) CSF FILM ARRAY Not Detected Not Detected    PARECHOVIRUS CSF FILM ARRAY Not Detected Not Detected    Varicella-Zoster, PCR Not Detected Not Detected    CRYPTOCOCCUS NEOFORMANS/AYO CSF FILM ARR. Not Detected Not Detected   Blood Gas, Arterial   Result Value Ref Range    pH, Blood Gas 7.26 (L) 7.35 - 7.45    PCO2 54 (H) 35 - 45 mmhg    PO2 275 (H) 71 - 104 mmhg    HCO3 24 23 - 28 mmol/l    Base Excess (Calculated) -3.4 (L) -2.5 - 2.5 mmol/l    O2 Sat 100 %    IFIO2 15     DEVICE NRB     Parth Test Positive     Source: R Radial     COLLECTED BY: [id number]    CBC with Auto Differential   Result Value Ref Range    WBC 30.1 (HH) 4.8 - 10.8 thou/mm3    RBC 3.90 (L) 4.20 - 5.40 mill/mm3    Hemoglobin 12.6 12.0 - 16.0 gm/dl    Hematocrit 38.4 37.0 - 47.0 %    MCV 98.5 81.0 - 99.0 fL    MCH 32.3 26.0 - 33.0 pg    MCHC 32.8 32.2 - 35.5 gm/dl    RDW-CV 12.4 11.5 - 14.5 %    RDW-SD 44.6 35.0 - 45.0 fL    Platelets 989 (H) 520 - 400 thou/mm3    MPV 10.9 9.4 - 12.4 fL    Pathologist Review MIREYA NYE      Seg Neutrophils 58.4 %    Lymphocytes 32.9 %    Monocytes 7.3 %    Eosinophils 0.5 %    Basophils 0.3 %    Immature Granulocytes 0.6 %    Segs Absolute 17.6 (H) 1.8 - 7.7 thou/mm3    Lymphocytes Absolute 9.9 (H) 1.0 - 4.8 thou/mm3 Monocytes Absolute 2.2 (H) 0.4 - 1.3 thou/mm3    Eosinophils Absolute 0.2 0.0 - 0.4 thou/mm3    Basophils Absolute 0.1 0.0 - 0.1 thou/mm3    Immature Grans (Abs) 0.18 (H) 0.00 - 0.07 thou/mm3    nRBC 0 /100 wbc   Comprehensive Metabolic Panel   Result Value Ref Range    Glucose 159 (H) 70 - 108 mg/dL    Creatinine 0.7 0.4 - 1.2 mg/dL    BUN 10 7 - 22 mg/dL    Sodium 136 135 - 145 meq/L    Potassium 4.1 3.5 - 5.2 meq/L    Chloride 97 (L) 98 - 111 meq/L    CO2 28 23 - 33 meq/L    Calcium 9.6 8.5 - 10.5 mg/dL    AST 30 5 - 40 U/L    Alkaline Phosphatase 89 38 - 126 U/L    Total Protein 7.3 6.1 - 8.0 g/dL    Albumin 4.5 3.5 - 5.1 g/dL    Total Bilirubin 0.4 0.3 - 1.2 mg/dL    ALT 16 11 - 66 U/L   Troponin   Result Value Ref Range    Troponin T < 0.010 ng/ml   Brain Natriuretic Peptide   Result Value Ref Range    Pro-.0 (H) 0.0 - 450.0 pg/mL   Ammonia   Result Value Ref Range    Ammonia 70 (H) 11 - 60 umol/L   Protime-INR   Result Value Ref Range    INR 0.92 0.85 - 1.13   APTT   Result Value Ref Range    aPTT 28.9 22.0 - 38.0 seconds   Lactic Acid   Result Value Ref Range    Lactic Acid 2.3 (H) 0.5 - 2.0 mmol/L   CK   Result Value Ref Range    Total  (H) 30 - 135 U/L   Ethanol   Result Value Ref Range    ETHYL ALCOHOL, SERUM < 0.01 0.00 %   Acetaminophen Level   Result Value Ref Range    Acetaminophen Level < 5.0 0.0 - 97.6 ug/mL   Salicylate   Result Value Ref Range    Salicylate, Serum < 0.3 (L) 2.0 - 10.0 mg/dL   HCG Qualitative, Serum   Result Value Ref Range    Preg, Serum NEGATIVE NEGATIVE   Urine Drug Screen   Result Value Ref Range    Amphetamine+Methamphetamine Urine Screen Negative NEGATIVE    Barbiturate Quant, Ur Negative NEGATIVE    Benzodiazepine Quant, Ur Negative NEGATIVE    Cannabinoid Quant, Ur POSITIVE NEGATIVE    Cocaine Metab Quant, Ur POSITIVE NEGATIVE    Opiates, Urine Negative NEGATIVE    Oxycodone Negative NEGATIVE    PCP Quant, Ur Negative NEGATIVE    Fentanyl POSITIVE NEGATIVE Procalcitonin   Result Value Ref Range    Procalcitonin 0.06 0.01 - 0.09 ng/mL   Glomerular Filtration Rate, Estimated   Result Value Ref Range    Est, Glom Filt Rate >60 >60 ml/min/1.73m2   Anion Gap   Result Value Ref Range    Anion Gap 11.0 8.0 - 16.0 meq/L   Osmolality   Result Value Ref Range    Osmolality Calc 274.4 (L) 275.0 - 300.0 mOsmol/kg   Scan of Blood Smear   Result Value Ref Range    SCAN OF BLOOD SMEAR see below    Glucose CSF   Result Value Ref Range    Glucose, CSF 69 40 - 80 mg/dl   Protein CSF   Result Value Ref Range    Protein, CSF 28 12 - 60 mg/dl   Cell Count with Differential, CSF   Result Value Ref Range    Color, CSF COLORLESS     Character, CSF CLEAR     Tube Volume Received CSF 1.0 ml    Total Nucleated Cells CSF 1 0 - 5 /cumm    RBC, CSF 1 0/cumm /cumm    Lymphs, CSF 80 0 - 90 %    Monocytes, CSF 20 0 - 45 %    Pathologist Review MIREYA NYE    TSH with Reflex   Result Value Ref Range    TSH 2.230 0.400 - 4.200 uIU/mL   POCT Glucose   Result Value Ref Range    POC Glucose 173 (H) 70 - 108 mg/dl   EKG 12 Lead   Result Value Ref Range    Ventricular Rate 94 BPM    Atrial Rate 94 BPM    P-R Interval 134 ms    QRS Duration 84 ms    Q-T Interval 380 ms    QTc Calculation (Bazett) 475 ms    P Axis 63 degrees    R Axis 38 degrees    T Axis 52 degrees       EKG / Radiology:     EKG:  Reviewed by me --    CXR:   Reviewed by me --    CT HEAD WO CONTRAST    Result Date: 11/9/2022  PROCEDURE: CT HEAD WO CONTRAST CLINICAL INFORMATION: Unresponsive. COMPARISON: No prior study. TECHNIQUE: 5 mm axial imaging through the head without IV contrast. All CT scans at this facility use dose modulation, iterative reconstruction, and/or weight based dosing when appropriate to reduce the radiation dose to as low as reasonably achievable. FINDINGS: The ventricular size is proportionate is not enlarged. No hydrocephalus is observed. The gray-white matter differentiation is maintained.  No acute attenuation abnormality is identified. No intraparenchymal hemorrhage, mass, mass effect, or shift of the midline structures is observed. No extra-axial fluid collections are identified. The bony calvarium is intact. The paranasal sinuses and mastoid air cells are clear. No acute intracranial process is visualized. **This report has been created using voice recognition software. It may contain minor errors which are inherent in voice recognition technology. ** Final report electronically signed by Dr Robyn Sanches on 11/9/2022 12:02 PM    CT CERVICAL SPINE WO CONTRAST    Result Date: 11/9/2022  PROCEDURE: CT CERVICAL SPINE WO CONTRAST CLINICAL INFORMATION: Trauma. Unresponsive. COMPARISON: No prior study. TECHNIQUE: 3 mm axial imaging through the cervical spine without contrast.  Sagittal and coronal reconstructions were performed. All CT scans at this facility use dose modulation, iterative reconstruction, and/or weight based dosing when appropriate to reduce the radiation dose to as low as reasonably achievable. FINDINGS: Straightening of the normal cervical alignment is present. No fracture or anterolisthesis is observed. Vertebral body heights and intervertebral disc spaces are maintained. C1-C2 articulation and the craniovertebral junction appear intact. The prevertebral soft tissue thickness is normal. Emphysematous changes at the lung apices are partially visualized. No fracture or acute bony abnormality visualized. Straightening of the normal cervical alignment either due to patient positioning versus muscular spasm. Centrilobular emphysema noted at the lung apices. **This report has been created using voice recognition software. It may contain minor errors which are inherent in voice recognition technology. ** Final report electronically signed by Dr Robyn Sanches on 11/9/2022 12:04 PM    XR CHEST PORTABLE    Result Date: 11/9/2022  PROCEDURE: XR CHEST PORTABLE CLINICAL INFORMATION: 32-year-old female with shortness of breath. COMPARISON: No prior study. TECHNIQUE: AP upright view of the chest was obtained. FINDINGS: The lungs are clear. The cardiac silhouette and pulmonary vasculature are within normal limits. There is no significant pleural effusion or pneumothorax. Visualized portions of the upper abdomen are within normal limits. The osseous structures are intact. No acute fractures or suspicious osseous lesions. There is no acute intrathoracic process. **This report has been created using voice recognition software. It may contain minor errors which are inherent in voice recognition technology. ** Final report electronically signed by Dr Cedric Barrera on 11/9/2022 11:09 AM    FEN/GI/DVT:  IVF: None  Electrolytes: Monitor and replace per protocols  Diet: General  GI PPX: No  DVT Prophylaxis: Lovenox    CODE STATUS:  Full    Thank you No primary care provider on file. for the opportunity to be involved in this patient's care.     Electronically signed by Sorin Braswell DO on 11/9/2022 at 4:48 PM

## 2022-11-09 NOTE — ED NOTES
Pt alert and talking with this RN. Pt states \"I thought I was doing cocaine and it ended up not being it. \" Pt states she snorted the drugs around 9:30am. Pt on 4L NC at this time, 100%.        [facility]  11/09/22 3743

## 2022-11-09 NOTE — ED NOTES
Pt resting in bed with family at bedside, respirations even and unlabored. No needs expressed at this time. Call light within reach.  [facility]  11/09/22 5202

## 2022-11-09 NOTE — ED NOTES
Pt refusing covid/flu swab, Dr Gordo Castorena made aware     Erica Lantigua, MIGUEL  11/09/22 [phone]

## 2022-11-09 NOTE — PLAN OF CARE
Problem: Safety - Adult  Goal: Free from fall injury  Outcome: Progressing  Note: Bed locked & in low position, call light in reach, side-rails up x2, bed/chair alarm utilized, non-slip socks on when ambulating, reminded patient to use call light to call for assistance. Care plan reviewed with patient. Patient verbalizes understanding of the care plan and contributed to goal setting.

## 2022-11-09 NOTE — ED NOTES
ED to inpatient nurses report    Chief Complaint   Patient presents with    Drug Overdose      Present to ED from home  LOC: alert and orientated to name, place, date  Vital signs   Vitals:    11/09/22 1222 11/09/22 1334 11/09/22 1343 11/09/22 1411   BP: 109/73 104/69 102/68 100/64   Pulse: 90 93 80 76   Resp: 16 18 16 16   SpO2: 96% 95% 95% 95%      Oxygen Baseline RA    Current needs required 1L Bipap/Cpap No  LDAs:   Peripheral IV 11/09/22 Left Antecubital (Active)   Site Assessment Clean, dry & intact 11/09/22 1035   Dressing Status Clean, dry & intact 11/09/22 1035   Dressing Type Transparent 11/09/22 1035   Dressing Intervention New 11/09/22 1035       Peripheral IV 11/09/22 Right Antecubital (Active)   Site Assessment Clean, dry & intact 11/09/22 1035   Dressing Status Clean, dry & intact 11/09/22 1035   Dressing Type Transparent 11/09/22 1035   Dressing Intervention New 11/09/22 1035     Mobility: Requires assistance * 1  Pending ED orders: Complete  Present condition: Stable    Electronically signed by Stoney Diop, RN on 11/9/2022 at Keli العراقي RN  11/09/22 3982

## 2022-11-09 NOTE — ED NOTES
Following administration of Narcan, pt shaking in bed, stating \"my body is on fire. \" Verbal order for 25mg Benadryl per Dr Wild Hoover, RN  11/09/22 3217

## 2022-11-09 NOTE — ED NOTES
Respiratory called for ABG.  EKG completed      [facility], [address] [place] [address]  11/09/22 1767

## 2022-11-10 PROBLEM — D64.9 NORMOCYTIC ANEMIA: Status: ACTIVE | Noted: 2022-11-10

## 2022-11-10 PROBLEM — D75.839 THROMBOCYTOSIS: Status: ACTIVE | Noted: 2022-11-10

## 2022-11-10 PROBLEM — F19.10 SUBSTANCE ABUSE (HCC): Status: ACTIVE | Noted: 2022-11-10

## 2022-11-10 PROBLEM — T40.601A OPIATE OVERDOSE (HCC): Status: ACTIVE | Noted: 2022-11-09

## 2022-11-10 PROBLEM — G92.9 TOXIC ENCEPHALOPATHY: Status: ACTIVE | Noted: 2022-11-10

## 2022-11-10 PROBLEM — D72.829 LEUKOCYTOSIS: Status: ACTIVE | Noted: 2022-11-10

## 2022-11-10 PROBLEM — E87.20 LACTIC ACIDOSIS: Status: ACTIVE | Noted: 2022-11-10

## 2022-11-10 PROBLEM — E72.20 HYPERAMMONEMIA (HCC): Status: ACTIVE | Noted: 2022-11-10

## 2022-11-10 PROBLEM — Z72.0 TOBACCO ABUSE: Status: ACTIVE | Noted: 2022-11-10

## 2022-11-10 PROBLEM — J96.01 ACUTE RESPIRATORY FAILURE WITH HYPOXIA (HCC): Status: ACTIVE | Noted: 2022-11-10

## 2022-11-10 PROBLEM — E87.29 RESPIRATORY ACIDOSIS: Status: ACTIVE | Noted: 2022-11-10

## 2022-11-10 LAB
ANION GAP SERPL CALCULATED.3IONS-SCNC: 11 MEQ/L (ref 8–16)
BILIRUBIN URINE: NEGATIVE
BLOOD, URINE: NEGATIVE
BUN BLDV-MCNC: 4 MG/DL (ref 7–22)
CALCIUM SERPL-MCNC: 8.6 MG/DL (ref 8.5–10.5)
CHARACTER, URINE: CLEAR
CHLORIDE BLD-SCNC: 106 MEQ/L (ref 98–111)
CO2: 22 MEQ/L (ref 23–33)
COLOR: YELLOW
CREAT SERPL-MCNC: 0.4 MG/DL (ref 0.4–1.2)
ERYTHROCYTE [DISTWIDTH] IN BLOOD BY AUTOMATED COUNT: 12.3 % (ref 11.5–14.5)
ERYTHROCYTE [DISTWIDTH] IN BLOOD BY AUTOMATED COUNT: 43.3 FL (ref 35–45)
GFR SERPL CREATININE-BSD FRML MDRD: > 60 ML/MIN/1.73M2
GLUCOSE BLD-MCNC: 87 MG/DL (ref 70–108)
GLUCOSE URINE: NEGATIVE MG/DL
HCT VFR BLD CALC: 32.1 % (ref 37–47)
HEMOGLOBIN: 10.2 GM/DL (ref 12–16)
KETONES, URINE: NEGATIVE
LEUKOCYTE ESTERASE, URINE: NEGATIVE
LV EF: 55 %
LVEF MODALITY: NORMAL
MCH RBC QN AUTO: 30.4 PG (ref 26–33)
MCHC RBC AUTO-ENTMCNC: 31.8 GM/DL (ref 32.2–35.5)
MCV RBC AUTO: 95.5 FL (ref 81–99)
NITRITE, URINE: NEGATIVE
PH UA: 6 (ref 5–9)
PLATELET # BLD: 261 THOU/MM3 (ref 130–400)
PMV BLD AUTO: 11.1 FL (ref 9.4–12.4)
POTASSIUM REFLEX MAGNESIUM: 3.8 MEQ/L (ref 3.5–5.2)
PROTEIN UA: NEGATIVE
RBC # BLD: 3.36 MILL/MM3 (ref 4.2–5.4)
SODIUM BLD-SCNC: 139 MEQ/L (ref 135–145)
SPECIFIC GRAVITY, URINE: <= 1.005 (ref 1–1.03)
UROBILINOGEN, URINE: 0.2 EU/DL (ref 0–1)
WBC # BLD: 11 THOU/MM3 (ref 4.8–10.8)

## 2022-11-10 PROCEDURE — 36415 COLL VENOUS BLD VENIPUNCTURE: CPT

## 2022-11-10 PROCEDURE — 2140000000 HC CCU INTERMEDIATE R&B

## 2022-11-10 PROCEDURE — 95816 EEG AWAKE AND DROWSY: CPT

## 2022-11-10 PROCEDURE — 6370000000 HC RX 637 (ALT 250 FOR IP)

## 2022-11-10 PROCEDURE — 2580000003 HC RX 258: Performed by: STUDENT IN AN ORGANIZED HEALTH CARE EDUCATION/TRAINING PROGRAM

## 2022-11-10 PROCEDURE — 85027 COMPLETE CBC AUTOMATED: CPT

## 2022-11-10 PROCEDURE — 80048 BASIC METABOLIC PNL TOTAL CA: CPT

## 2022-11-10 PROCEDURE — 6360000002 HC RX W HCPCS

## 2022-11-10 PROCEDURE — 93306 TTE W/DOPPLER COMPLETE: CPT

## 2022-11-10 PROCEDURE — 2580000003 HC RX 258

## 2022-11-10 PROCEDURE — 95816 EEG AWAKE AND DROWSY: CPT | Performed by: PSYCHIATRY & NEUROLOGY

## 2022-11-10 PROCEDURE — 99233 SBSQ HOSP IP/OBS HIGH 50: CPT | Performed by: FAMILY MEDICINE

## 2022-11-10 PROCEDURE — 81003 URINALYSIS AUTO W/O SCOPE: CPT

## 2022-11-10 PROCEDURE — 6370000000 HC RX 637 (ALT 250 FOR IP): Performed by: STUDENT IN AN ORGANIZED HEALTH CARE EDUCATION/TRAINING PROGRAM

## 2022-11-10 PROCEDURE — 6360000002 HC RX W HCPCS: Performed by: STUDENT IN AN ORGANIZED HEALTH CARE EDUCATION/TRAINING PROGRAM

## 2022-11-10 RX ADMIN — SODIUM CHLORIDE: 9 INJECTION, SOLUTION INTRAVENOUS at 22:49

## 2022-11-10 RX ADMIN — NALOXONE HYDROCHLORIDE 0.3 MG/HR: 1 INJECTION PARENTERAL at 16:16

## 2022-11-10 RX ADMIN — PIPERACILLIN AND TAZOBACTAM 3375 MG: 3; .375 INJECTION, POWDER, FOR SOLUTION INTRAVENOUS at 22:49

## 2022-11-10 RX ADMIN — PIPERACILLIN AND TAZOBACTAM 3375 MG: 3; .375 INJECTION, POWDER, FOR SOLUTION INTRAVENOUS at 06:11

## 2022-11-10 RX ADMIN — PIPERACILLIN AND TAZOBACTAM 3375 MG: 3; .375 INJECTION, POWDER, FOR SOLUTION INTRAVENOUS at 14:34

## 2022-11-10 RX ADMIN — NALOXONE HYDROCHLORIDE 0.4 MG/HR: 1 INJECTION PARENTERAL at 04:50

## 2022-11-10 RX ADMIN — NALOXONE HYDROCHLORIDE 0.4 MG/HR: 1 INJECTION PARENTERAL at 10:07

## 2022-11-10 RX ADMIN — ACETAMINOPHEN 650 MG: 325 TABLET ORAL at 19:59

## 2022-11-10 RX ADMIN — ENOXAPARIN SODIUM 40 MG: 100 INJECTION SUBCUTANEOUS at 21:33

## 2022-11-10 RX ADMIN — Medication 3 MG: at 21:34

## 2022-11-10 RX ADMIN — SODIUM CHLORIDE, PRESERVATIVE FREE 10 ML: 5 INJECTION INTRAVENOUS at 20:01

## 2022-11-10 ASSESSMENT — PAIN SCALES - GENERAL: PAINLEVEL_OUTOF10: 6

## 2022-11-10 ASSESSMENT — LIFESTYLE VARIABLES
HOW OFTEN DO YOU HAVE A DRINK CONTAINING ALCOHOL: MONTHLY OR LESS
HOW MANY STANDARD DRINKS CONTAINING ALCOHOL DO YOU HAVE ON A TYPICAL DAY: 1 OR 2

## 2022-11-10 ASSESSMENT — PAIN DESCRIPTION - LOCATION: LOCATION: HEAD

## 2022-11-10 ASSESSMENT — PAIN DESCRIPTION - DESCRIPTORS: DESCRIPTORS: ACHING

## 2022-11-10 NOTE — PROGRESS NOTES
Pt was in bed and could not utter a word at the time of the visit. Her sister was there. Pt was dealing with Opoid overdose. Accidental or unintentional. Her sister was there for her and she assured her of her strong support. Words of encouragement was offered and she was blessed. 11/10/22 1356   Encounter Summary   Encounter Overview/Reason  Initial Encounter   Service Provided For: Patient and family together   Referral/Consult From: [facility] Family members   Last Encounter  11/10/22   Complexity of Encounter Low   Begin Time 0842   End Time  0850   Total Time Calculated 8 min   Spiritual/Emotional needs   Type Spiritual Support   Assessment/Intervention/Outcome   Assessment Anxious; Fearful   Intervention Active listening;Empowerment   Outcome Acceptance;Encouraged

## 2022-11-10 NOTE — PROGRESS NOTES
[facility] Laboratory Technician Worksheet      EEG Date: 11/10/2022    Name: Usha Madrigal   : 1990   Age: 36 y.o. SEX: female    ROOM: 22 MRN: [id number]           CSN: [id number]      Ordering Provider: Radhika Diaz  EEG Number: [id number] Time of Test:  1026    Hand: Right   Sedation: no    H.V. Done: No  NOT DONE  Photic: Yes    Sleep: No  Drowsy: No   Sleep Deprived: No    Seizures observed: NO, EXTREMITY JERKS NOTED    Mentality: lethargic      Clinical History: FOUND UNRESPONSIVE, OVERDOSE ON COCAINE , CT  Impression   No acute intracranial process is visualized. Past Medical History: No past medical history on file.     Scheduled Meds:   sodium chloride flush  5-40 mL IntraVENous 2 times per day    enoxaparin  40 mg SubCUTAneous Q24H    melatonin  3 mg Oral Nightly    piperacillin-tazobactam  3,375 mg IntraVENous Q8H     Continuous Infusions:   sodium chloride      sodium chloride      [Held by provider] naloxone (NARCAN) infusion 0.4 mg/hr (11/10/22 1007)     PRN Meds:.sodium chloride, sodium chloride flush, sodium chloride, ondansetron **OR** ondansetron, polyethylene glycol, acetaminophen **OR** acetaminophen, methocarbamol, cloNIDine, loperamide, dicyclomine, hydrOXYzine pamoate, ibuprofen    Technician: Fatemeh Rivas 11/10/2022

## 2022-11-10 NOTE — ADT AUTH CERT
Utilization Reviews       Drug Ingestion or Overdose - Care Day 1 (11/9/2022) by Valerie Choi       Review Entered Review Status   11/10/2022 1600 Completed      Criteria Review      Care Day: 1 Care Date: 11/9/2022 Level of Care: Intermediate Care    Guideline Day 1    Level Of Care    (X) ICU, intermediate care, or telemetry    11/10/2022 3:59 PM EST by Sarah Romero Intermediate Tele    Clinical Status    (X) * Clinical Indications met    11/10/2022 3:59 PM EST by Bobbi Hayden.      (+) tachycardia, hypoxia, AMS    (X) Possible tachycardia, confusion, hypotension, or other toxicity    11/10/2022 3:59 PM EST by Sarah Maldonado. RI: 109, 113    Activity    (X) Bed rest, increased activity as tolerated    11/10/2022 3:59 PM EST by Sarah Romero Up with assistance    Routes    (X) IV fluids    11/10/2022 3:59 PM EST by Millie Hayden      0.9 % sodium chloride bolus 2,000ml once IV    (X) IV medications    11/10/2022 4:00 PM EST by Millie Hayden      naloxone [facility]) injection 2 mg once IV  vancomycin (VANCOCIN) 1,000 mg in sodium chloride 0.9 % 250 mL IVPB ([id number]) once IV    11/10/2022 3:59 PM EST by Millie Hayden      cefTRIAXone (ROCEPHIN) 2,000 mg in dextrose 5 % 50 mL IVPB mini-bag once IV  diphenhydrAMINE (BENADRYL) injection 25 mg once IV  piperacillin-tazobactam (ZOSYN) 3,375 mg in dextrose 5 % 50 mL IVPB extended infusion (mini-bag) q8 IV    (X) Possible oral diet    11/10/2022 3:59 PM EST by Bobbi Hayden.      ADULT DIET;  Regular    Interventions    (X) Possible antidote administration    11/10/2022 3:59 PM EST by Sarah Maldonado.      px received narcan IV    Medications    (X) Parenteral or oral antidotes as indicated    11/10/2022 3:59 PM EST by Millie Hayden      naloxone [facility]) injection 2 mg once IV    * Milestone   Additional Notes   DATE: 11/09 Intermediate Tele          Chief Complaint/ED Presentation:   Patient is a 36year old  Female with PMH Drug Abuse. She presented to [facility] ED after being found unresponsive by her boyfriend while driving to [facility]. Patient's sister was at bedside to provide some history as patient is somnolent and easily falls back asleep. Upon arrival patient was unresponsive with SpO2 17% on RA. She was given Narcan 2mg intranasally and bag masked. She soon became responsive to pain and placed on NRB now transitioned to nasal cannula. Patient states that her last use was around 9:30AM. She states she thought she snorted cocaine, but it may have had something else in it. She states she normally uses 1g a day 2-3 times a week. She otherwise uses cannabis. Vitals:   T: 99.1 (37.3) RR: 20 DE: 113 BP: 144/100   SPo2 95% on 1-6L via NC   GCS 14         Abnl/Pertinent Labs/Radiology/Diagnostic Studies:   XR CHEST:   There is no acute intrathoracic process. CT HEAD:   No acute intracranial process is visualized. CT CERVICAL SPINE:   No fracture or acute bony abnormality visualized. Straightening of the normal cervical alignment either due to patient positioning versus muscular spasm. Centrilobular emphysema noted at the lung apices.       POC Glucose: 173 (H)   Chloride: 97 (L)   Glucose, Random: 159 (H)   Osmolality Ca.4 (L)   Total CK: 146 (H)   Pro-BNP: 654.5 (H)      Salicylate, Serum: < 0.3 (L)      WBC: 30.1 (HH) (C)   RBC: 3.90 (L)   Hemoglobin Quant: 12.6   Hematocrit: 38.4      pH, Blood Gas: 7.26 (L)   PCO2: 54 (H)   pO2: 275 (H)   Base Excess (Calculated): -3.4 (L)      Lactic Acid: 2.3 (H)   AMMONIA, PLASMA, [id number]: 70 (H)      Cannabinoid Quant, Ur: POSITIVE   Cocaine Metab Quant, Ur: POSITIVE   Fentanyl: POSITIVE         ED treatment:   0.9 % sodium chloride bolus 2,000 mL IV  x 1   cefTRIAXone (ROCEPHIN) 2,000 mg in dextrose 5 % 50 mL IVPB mini-bag IV x 1   diphenhydrAMINE (BENADRYL) injection 25 mg IV x 1   lactulose (CHRONULAC) 10 GM/15ML solution 20 g PO x 1 naloxone (NARCAN) injection 2 mg IV x 1   vancomycin (VANCOCIN) 1,000 mg in sodium chloride 0.9 % 250 mL IVPB ([id number]) IV x 1      MDM:   She was given bag mask ventilation for 2 minutes, SaO2 improved from 17% to 100%. Patient regained spontaneous breathing. Patient however became lethargic again in ED  a second dose of IV Narcan 2mg was given and caused mild withdrawal symptoms which improved after 20-30 minutes. .       On reassessment, patient was alert and oriented x 4. Still looked tired by SaO2 around 96% on 2L/min NC       Labs were reviewed, WBC 30, ammonia 70. UDS showed positive for cocaine and fentanyl. Otherwise labs were reassuring. Given that patient's complained neck pain, LP was performed, CSF was clear. Empirically patient received IV Rocephin and vancomycin. Plan to initiate Narcan drip but patient was reluctant for that in ED. CTs head and cervical spine did not show acute abnormality. Chest x-ray had no acute changes, no pulmonary edema. Admission Diagnosis/Op Note:   1. Narcotic-induced respiratory depression    2. Leukocytosis, unspecified type    3. Increased ammonia level    4. Polysubstance abuse           Pertinent Medical History:   Px has no past medical history on file. Physical Exam:   General appearance: Somnolent, chronically ill-appearing female. Cooperative. NAD. HEENT:  Normocephalic / atraumatic. PERRL. EOM intact. Conjunctivae appear normal.   Neck: Supple. No JVD. Respiratory: Normal respiratory effort on RA. CTAB. No wheezes / rales / rhonchi. Cardiovascular: RRR. Normal S1/S2. No murmurs / rubs / gallops. Abdomen: Soft / non-tender / non-distended. BS present. Musculoskeletal: No cyanosis or edema. Skin: Warm / dry. Normal turgor. Neurologic: A/O x 3. Speech normal. Answers questions appropriately. CN intact. No obvious focal neurologic deficits.    Psychiatric: Thought content / judgment / insight appear poor Capillary refill: Brisk bilaterally. Peripheral pulses: +2 bilaterally. MD Consults/Assessments & Plans:   **IM:   ASSESSMENT / PLAN:   1. Opioid Overdose, accidental or unintentional: UDS+ Cocaine, Cannabinoids, Fentanyl. Uses 1g cocaine 2-3x/week per patient. Most recent use 9:30AM on 11/9/22 - thought it was cocaine, but may have contained fentanyl. S/p Narcan 2mg x1 in the ED. Continue Narcan gtt for now. Supportive care for opiate withdrawal symptoms. Continue Neuro checks q4hrs. Low threshold for ICU transfer if patient's mentation/respiratory status declines. 2. Lactic Acidosis: No obvious infectious source. ?aspiration. Hypoxia likely contributing. S/p IVF bolus. S/p Rocephin and Vancomycin. Continue Zosyn q8hrs for broad coverage - de-escalate in 24-48hrs  if no obvious source found. 3. Hyperammonemia: No history of liver disease. S/p Lactulose 20g x1 in ED. No need to recheck as long as patient's mentation improves with narcan gtt. 4. Leukocytosis / Thrombocytosis: Suspect reactive, but no obvious source of infection. ? aspiration due to opiate overdose. S/p Rocephin and Vancomycin. Continue Zosyn q8hrs for broad coverage - de-escalate in 24-48hrs  if no obvious source found. 5. Respiratory Acidosis: Suspect due to opiate/fentantyl overdose as above. S/p narcan. Continue Narcan gtt. Repeat VBG. Medications:   enoxaparin (LOVENOX) injection 40 mg q24 SC   lactulose (CHRONULAC) 10 GM/15ML solution 20 g once PO         Orders:   - Inpatient consult to Social Work    - Inpatient Consult to Addiction Service   - Opiate assessment    - Neurovascular checks    - Telemetry monitoring - 48 hour duration                  Drug Ingestion or Overdose - Clinical Indications for Admission to Inpatient Care by Millie Hayden       Review Entered Review Status   11/10/2022 4972 Completed      Criteria Review      Clinical Indications for Admission to Inpatient Care    Most Recent : Sarah Maldonado. Most Recent Date: 11/10/2022 3:54 PM EST    (X) Admission is indicated for  1 or more  of the following  (1) (2) (3) (4) (5) (6) (7):       (X) Hemodynamic instability       11/10/2022 3:54 PM EST by Sarah Maldonado. [place]: 109-113  Lactic Acid: 2.3 (H)       (X) Hypoxemia       11/10/2022 3:54 PM EST by Sarah Maldonado.          Spo2 17% on RA       (X) Altered mental status that is severe or persistent       11/10/2022 3:54 PM EST by Sarah Maldonado.         patient is somnolent and easily falls back asleep       Definitions    Hemodynamic instability    (X) Hemodynamic instability, as indicated by  1 or more  of the following  (1) (2) (3) (4) (5):       (X) Vital sign abnormality not readily corrected by appropriate treatment, as indicated by  1       or more  of the following  [A]:          (X) Tachycardia that persists despite appropriate treatment (eg, volume repletion, treatment of          pain, treatment of underlying cause)       ( ) Hypotension that is severe, as indicated by  ALL  of the following :          (X) Severe hypotension, as indicated by  1 or more  of the following :             (X) Lactate of 2.0 mmol/L (18 mg/dL) or more secondary to hypotension (ie, hypoperfusion) [B]             (3)       Tachycardia    (X) Tachycardia, [A] as indicated by  1 or more  of the following  (1) (2):       (X) Heart rate greater than 100 beats per minute in adult or child age 10 years or older [A]       Hypoxemia    (X) Hypoxemia, as indicated by  1 or more  of the following  (1):       (X) Patient without baseline need for supplemental oxygen with oxygen saturation (SaO2) of less       than 90% or arterial blood gas partial pressure of oxygen (PO2) of less than 60 mm Hg (8.0       kPa) on room air [A] [B]       Altered mental status that is severe or persistent    (X) Altered mental status (ie, different from baseline) that is severe or persistent, as indicated    by  1 or more  of the following (1) (2) (3) (4):       (X) Lethargy (awake or arousable, but with drowsiness; reduced awareness of self and environment)       that persists (eg, for more than few hours) despite appropriate treatment (eg, of underlying       cause)

## 2022-11-10 NOTE — CARE COORDINATION
Case Management Assessment  Initial Evaluation    Date/Time of Evaluation: 11/10/2022 12:57 PM  Assessment Completed by: Balbina Lo RN    If patient is discharged prior to next notation, then this note serves as note for discharge by case management. Patient Name: Krys Cosby                   YOB: 1990  Diagnosis: Lethargy [R53.83]  Polysubstance abuse ([facility] [address] [F19.10]  Narcotic-induced respiratory depression [R06.89, T40.605A]  Increased ammonia level [R79.89]  Opioid overdose, accidental or unintentional, initial encounter ([facility] [address] [T40.2X1A]  Leukocytosis, unspecified type [D72.829]                   Date / Time: 11/9/2022 10:33 AM    Patient Admission Status: Inpatient     Current PCP: No primary care provider on file. PCP verified by CM? No (Declined to be set up)    Chart Reviewed: Yes      Patient Orientation: Alert and Oriented    Patient Cognition: Alert  History Provided by: Patient    Hospitalization in the last 30 days (Readmission):  No    If yes, Readmission Assessment in CM Navigator will be completed. Advance Directives:     Code Status: Full Code     Primary Decision MakerCarmelo Headings - Boyfriend - [phone]    Discharge Planning  Patient lives with: Children, Spouse/Significant Other, Family Members Type of Home: House   Primary Caregiver: Self  Patient Support Systems include: Spouse/Significant Other, Family Members   Current Financial resources: Other (Comment) (None)  Current community resources: Other (Comment) (None)  Current services prior to admission: None   Type of Home Care services:  None    ADLS  Prior functional level: Independent in ADLs/IADLs  Current functional level: Independent in ADLs/IADLs      Family can provide assistance at DC: No  Would you like Case Management to discuss the discharge plan with any other family members/significant others, and if so, who?  No  Plans to Return to Present Housing: Yes  Other Identified Issues/Barriers to RETURNING to current housing: None  Potential Assistance needed at discharge: N/A  Patient expects to discharge to: [address] [place] [address] for transportation at discharge: Family    Financial  Payor: /     Does insurance require precert for SNF: N/A    Potential assistance Purchasing Medications: No  Meds-to-Beds request: Yes      [facility] [address] [phone] [facility] [phone]  [facility] [address] [phone]  Phone: [phone] Fax: [phone]      Factors facilitating achievement of predicted outcomes: Family support and Caregiver support    Barriers to discharge: Medication management    Additional Case Management Notes: Admitted through ED with unresponsiveness. Known drug overdose. Positive for Fentanyl, Cannabinoids and Cocaine. Narcan gtt started, it has now been stopped. Procedure:   11/9 EEG: results pending. 11/9 CT C-spine: No fracture or acute bony abnormality visualized. Straightening of the normal cervical alignment either due to patient positioning versus muscular spasm. Centrilobular emphysema noted at the lung apices. 11/10 Echo: to be done. The Plan for Transition of Care is related to the following treatment goals of Lethargy [R53.83]  Polysubstance abuse ([address] [F19.10]  Narcotic-induced respiratory depression [R06.89, T40.605A]  Increased ammonia level [R79.89]  Opioid overdose, accidental or unintentional, initial encounter ([address] [T40.2X1A]  Leukocytosis, unspecified type [D72.829]    Patient Goals/Plan/Treatment Preferences: Pt plans home with hoang, kids and her brother. Denies needs. Declined wanting to be set up with a PCP. Transportation/Food Security/Housekeeping Addressed: No issues identified.      Dex Elaine RN  Case Management Department

## 2022-11-10 NOTE — PROGRESS NOTES
PROGRESS NOTE      Patient:  Usha Biggsr      Unit/Bed:3B-22/022-A    YOB: 1990    MRN: [id number]       Acct: [de-identified]     PCP: No primary care provider on file. Date of Admission: 11/9/2022      Assessment/Plan:    Anticipated Discharge in : 1 day    YONNY/Jace [address] Problems    Diagnosis Date Noted    Acute respiratory failure with hypoxia ([facility] [J96.01] 11/10/2022     Priority: Medium    Leukocytosis [D72.829] 11/10/2022     Priority: Medium    Toxic encephalopathy [G92.9] 11/10/2022     Priority: Medium    Lactic acidosis [E87.20] 11/10/2022     Priority: Medium    Respiratory acidosis [E87.29] 11/10/2022     Priority: Medium    Hyperammonemia ([facility] [E72.20] 11/10/2022     Priority: Medium    Thrombocytosis [D75.839] 11/10/2022     Priority: Medium    Substance abuse ([facility] [F19.10] 11/10/2022     Priority: Medium    Tobacco abuse [Z72.0] 11/10/2022     Priority: Medium    Normocytic anemia [D64.9] 11/10/2022     Priority: Medium    Opiate overdose ([facility] Moiz Primer 11/09/2022     Priority: Medium    Narcotic-induced respiratory depression [R06.89, T40.605A] 11/09/2022     Priority: Medium       - Opiate Overdose, accidental or unintentional    - Patient reports taking cocaine at 9:30AM on 11/9/22, states it may have had something else in it and was likely laced with fentanyl  - Received Narcan upon arrival to ED, was bag masked. SpO2 on arrival was 17%. - UDS positive for cocaine, cannabinoids, and fentanyl  - Currently on Narcan drip, slowly tapering her off by decreasing dose by 0.1mg q2 hrs.  Pt was unarousable for about 20 minutes earlier today, per her nurse and sister.  - Continue neuro checks q4 hrs  - Supportive care for opiate withdrawal symptoms  - Inpatient consult to addiction services    - Narcotic- induced respiratory depression    - Patient currently on room air, SpO2 98%  - Was unarousable upon arrival to ED with SpO2 17%  - Was bag- masked in ED  - Narcan given in ED and drip continued upon admission    - Toxic encephalopathy    - Likely drug induced (recent ingestion of cocaine, fentanyl) given history of recent drug use and drug abuse  - CT head/ spine unremarkable 11/9/22    - Lactic Acidosis    - Lactic Acid in ED noted to be 2.3  - CXR unremarkable 11/9/22  - ? Suspect aspiration  - Patient currently on Zosyn q8 hrs x 15 doses  - Echo ordered today to evaluate for endocarditis given history of drug use  - LP performed in ED - PCR negative. Culture negative, protein and glucose WNL  - Meningitis panel negative  - CT head/ spine unremarkable 11/9/22    - Leukocytosis    - WBC today 11, decreased from 30.1 yesterday 11/9/22  - Unknown etiology  - ? Suspect aspiration  - CXR unremarkable  - Continue Zosyn q8 hrs x 15 doses  - Echo ordered today to evaluate for endocarditis given history of drug use  - LP performed in ED - PCR negative. Culture negative, protein and glucose WNL  - Meningitis panel negative    - Respiratory acidosis    - Likely 2/2 to opiate/ fentanyl overdose  - Continue Narcan gtt, taper down  - Repeat VBG in the AM     - Hyperammonemia    - Ammonia upon arrival to ED was 70  - ? Etiology unclear  - Lactulose 20g x 1 dose given in ED  - No history of liver disease    - Thrombocytosis, improved    - Platelet count improved from 468 to 261 today    - Neck pain    - Unlikely due to infectious cause. LP in ED, PCR negative  - Likely MSK related  - Alternate between tylenol and motrin for pain    - Acute normocytic anemia    - Hgb today 10.2  - ? 2/2 decreased PO intake for past 2 days  - Will recheck CBC in AM    - Substance abuse    - Uses 1g cocaine 2-3x/week per patient  - UDS positive for cocaine, cannabinoids, and fentanyl  - Encourage cessation  - Inpatient consult to addiction services    - Tobacco abuse    - Patient requested to smoke multiple times today.  Placed order for nicotine patch  - Encourage smoking cessation    - Abdominal cramping    - Continue Bentyl 20mg PO q6hrs    - Diarrhea    - Continue Imodium 2mg PO 4 times daily  - Increase hydration    - Nausea    - Continue Zofran 4mg q8 hrs PRN    - Difficulty sleeping    - Continue melatonin 3mg PO nightly      Chief Complaint: unresponsive    Hospital Course:     \"Patient is a 36year old  Female with PMH Drug Abuse. She presented to [facility] ED after being found unresponsive by her boyfriend while driving to [address] [place] [address]. Patient's sister was at bedside to provide some history as patient is somnolent and easily falls back asleep. Per patient's sister, patient was recently admitted to a hospital at around [facility] [address] [facility] for overdose with cocaine and fentanyl. She was discharged around 1am this morning. When patient went home patient's boyfriend decided to drive her to [address] [place] [address] because her sister lived there and patient became unresponsive on the way there and thus presented to [facility] ED. Upon arrival patient was unresponsive with SpO2 17% on RA. She was given Narcan 2mg intranasally and bag masked. She soon became responsive to pain and placed on NRB now transitioned to nasal cannula. Patient states that her last use was around 9:30AM. She states she thought she snorted cocaine, but it may have had something else in it. She states she normally uses 1g a day 2-3 times a week. She otherwise uses cannabis. She currently denies any dizziness, lightheadedness, chest pain, shortness of breath, abdominal pain, nausea, vomiting, dysuria. Initial VS , RR 16, /100, SpO2 17% on RA (now 95% on 1L NC). Labs significant for Lactic acid 2.3, ammonia 70, WBC 30.1. UDS +Cocaine, Cannabinoids, and Fentanyl. LP performed in ED as well - PCR negative. ABG 7.26/54/275/24. CT Head and Cervical Spine negative for acute findings. CXR shows no acute findings.  She was given 2L NS, Rocephin 2g x1, Vancomycin 1g x1, Benadryl 25mg x1, Lactulose 20g x1.\"    11/10/22:    - Narcan drip, tapering down by decreasing dosage by 0.1mg q 2 hrs  - Echo today to evaluate for endocarditis given pt is a drug user, also given leukocytosis, lactic acidosis ? Unknown etiology  - Continue IV Zosyn  - Nicotine patch  - Continue neuro checks q4 hrs  - Supportive care for opiate withdrawal symptoms  - Inpatient consult to addiction services      Subjective: The patient was seen and examined at bedside today. She states she wants to leave and go home. She is feeling better, but is having abdominal cramps and diarrhea. She is tolerating food and liquids and is not complaining of vomiting. She reports she wants to sleep all day as she's stuck in her bed and can't go anywhere. She requested to smoke several times. Nicotine patch was ordered. Noted by nurse and patient's sister that pt was unarousable earlier today for 20 minutes, even to external stimuli. Narcan drip was continued and tapered down. Medications:  Reviewed    Infusion Medications    sodium chloride      sodium chloride       Scheduled Medications    nicotine  1 patch TransDERmal Daily    sodium chloride flush  5-40 mL IntraVENous 2 times per day    enoxaparin  40 mg SubCUTAneous Q24H    melatonin  3 mg Oral Nightly    piperacillin-tazobactam  3,375 mg IntraVENous Q8H     PRN Meds: sodium chloride, sodium chloride flush, sodium chloride, ondansetron **OR** ondansetron, polyethylene glycol, acetaminophen **OR** acetaminophen, methocarbamol, cloNIDine, loperamide, dicyclomine, hydrOXYzine pamoate, ibuprofen      Intake/Output Summary (Last 24 hours) at 11/10/2022 1745  Last data filed at 11/10/2022 1330  Gross per 24 hour   Intake 325 ml   Output 1400 ml   Net -1075 ml       Diet:  ADULT DIET;  Regular  ADULT ORAL NUTRITION SUPPLEMENT; Breakfast; Standard High Calorie/High Protein Oral Supplement    Exam:  BP (!) 148/75   Pulse 79   Temp 98.4 °F (36.9 °C) (Oral)   Resp 18   Ht 5' 4\" (1.626 m)   Wt 129 lb 4.8 oz (58.7 kg)   SpO2 98% BMI 22.19 kg/m²     General appearance: No apparent distress, appears stated age and cooperative. Irritable, refusing to speak to/ acknowledge staff  HEENT: Conjunctivae/corneas clear. Neck: No jugular venous distention. Trachea midline. ROM limited secondary to neck pain  Respiratory:  Normal respiratory effort. Clear to auscultation, bilaterally without Rales/Wheezes/Rhonchi. Cardiovascular: Regular rate and rhythm with normal S1/S2 without murmurs, rubs or gallops. Abdomen: Soft, non-tender, non-distended  Musculoskeletal: passive and active ROM x 4 extremities. Skin: Skin color, texture, turgor normal.  No rashes or lesions. Neurologic:  Neurovascularly intact without any focal sensory/motor deficits. Psychiatric: AAO x 3  Peripheral Pulses: +2 palpable, equal bilaterally       Labs:   Recent Labs     11/09/22  1045 11/10/22  0548   WBC 30.1* 11.0*   HGB 12.6 10.2*   HCT 38.4 32.1*   * 261     Recent Labs     11/09/22  1045 11/10/22  0548    139   K 4.1 3.8   CL 97* 106   CO2 28 22*   BUN 10 4*   CREATININE 0.7 0.4   CALCIUM 9.6 8.6     Recent Labs     11/09/22  1045   AST 30   ALT 16   BILITOT 0.4   ALKPHOS 89     Recent Labs     11/09/22  1114   INR 0.92     Recent Labs     11/09/22  1045   CKTOTAL 146*       Urinalysis:      Lab Results   Component Value Date/Time    NITRU NEGATIVE 11/10/2022 05:00 AM    BLOODU NEGATIVE 11/10/2022 05:00 AM    GLUCOSEU NEGATIVE 11/10/2022 05:00 AM       Radiology:  CT HEAD WO CONTRAST   Final Result   No acute intracranial process is visualized. **This report has been created using voice recognition software. It may contain minor errors which are inherent in voice recognition technology. **      Final report electronically signed by Dr Kirstin Carter on 11/9/2022 12:02 PM      CT CERVICAL SPINE WO CONTRAST   Final Result   No fracture or acute bony abnormality visualized.  Straightening of the normal cervical alignment either due to patient positioning versus muscular spasm. Centrilobular emphysema noted at the lung apices. **This report has been created using voice recognition software. It may contain minor errors which are inherent in voice recognition technology. **      Final report electronically signed by Dr Mario Cade on 11/9/2022 12:04 PM      XR CHEST PORTABLE   Final Result   There is no acute intrathoracic process. **This report has been created using voice recognition software. It may contain minor errors which are inherent in voice recognition technology. **      Final report electronically signed by Dr Kaye Lancaster on 11/9/2022 11:09 AM          Diet: ADULT DIET;  Regular  ADULT ORAL NUTRITION SUPPLEMENT; Breakfast; Standard High Calorie/High Protein Oral Supplement    DVT prophylaxis: [x] Lovenox                                 [] SCDs                                 [] SQ Heparin                                 [] Encourage ambulation           [] Already on Anticoagulation     Disposition:    [x] Home       [] TCU       [] Rehab       [] Psych       [] SNF       [] [place]       [] Other-    Code Status: Full Code    PT/OT Eval Status: n/a      Electronically signed by Carlos Eduardo Cadet DO on 11/10/2022 at 5:45 PM

## 2022-11-10 NOTE — CARE COORDINATION
11/10/22, 11:58 AM EST    DISCHARGE PLANNING EVALUATION    Consult for cocaine abuse, rehab consideration. Genaro Bassett has seen and patient does not have any SW needs. Addictions services has been consulted.

## 2022-11-10 NOTE — PROCEDURES
Date: 11/10/2022  Referring physician: Watson Snider, DO    Indication  Patient aged 36 y with encephalopathy. EEG done to assess for epileptiform activity. Introduction  This routine 20-minute EEG was recorded using the International 10-20 System on a [employer] workstation at 256 samples/s. Automated spike and seizure detection algorithms were applied. Description  During the maximal alert state, a poorly-regulated, symmetric, and reactive 8 Hz posterior dominant rhythm was seen. No consistent focal slowing or interhemispheric asymmetry was noted. Stage I and stage II sleep were observed. There were no interictal epileptiform discharges or electrographic seizures. Activations  Hyperventilation was not performed. Intermittent photic stimulation was performed and demonstrated no posterior driving response. Impression  Abnormal awake EEG. The slowing mentioned above suggests mild non specific encephalopathy. No epileptiform discharges were identified. Please note the absence of such activity on this record cannot conclusively rule out an epileptic disorder. If such is still clinically suspected, a repeat study with sleep deprivation and/or prolonged sampling may be helpful. Thea Alba MD  Epilepsy Board Certified. Neurology Board Certified.     Electronically Signed

## 2022-11-10 NOTE — PROGRESS NOTES
Physician Progress Note      Humberto Baldwin  [facility] #:                  [id number]  :                       1990  ADMIT DATE:       2022 10:33 AM  DISCH DATE:  RESPONDING  PROVIDER #:        Horacio Chapman MD          QUERY TEXT:    Pt admitted with Opioid Overdose. Pt noted to have SaO2 was 17%. Patient was   given bag mask ventilation for about 2 minutes, SaO2 gradually climbed up   to100%. 17% on RA, 99 % bag valve , 99% on NRB 15 L, 100 % 6L  If possible,   please document in the progress notes and discharge summary if you are   evaluating and/or treating any of the following: The medical record reflects the following:  Risk Factors: resp acidosis  Clinical Indicators: 17% on RA, 99 % bag valve , 99% on NRB 15 L, 100 % 6L  SaO2 was 17% with good pleth. Patient was given bag mask ventilation for about   2 minutes, SaO2 gradually climbed up to100%. Treatment: Continuous pulse ox to monitor for desats/apnea. Neurochecks   q4hrs. Narcan drip overnight. Thank you. Please call if you have any questions. (p) [phone]. Signed   by Iesha Miller RN Clinical , CRCR  Options provided:  -- Acute respiratory failure with hypoxia  -- Chronic respiratory failure with hypoxia  -- Acute on chronic respiratory failure with hypoxia  -- Other - I will add my own diagnosis  -- Disagree - Not applicable / Not valid  -- Disagree - Clinically unable to determine / Unknown  -- Refer to Clinical Documentation Reviewer    PROVIDER RESPONSE TEXT:    This patient is in acute respiratory failure with hypoxia. Query created by: Sangeetha Graves on 11/10/2022 8:56 AM      QUERY TEXT:    Pt admitted with Opioid Overdose. Pt noted to have SIRS/Sepsis Criteria: WBC   30.1, LA 2.3, Procal 0.06, tachycardia 113/min, placed on  Rocephin,   vancomycin and had Lumbar Puncture.  If possible, please document in the   progress notes and discharge summary if you are evaluating and /or treating   any of the following: The medical record reflects the following:  Risk Factors: Lactic acidosis  Clinical Indicators: WBC 30.1, LA 2.3, Procal 0.06, tachycardia 113/min  Treatment: Rocephin, zosyn,  and Vancomycin    Thank you. Please call if you have any questions. (P) [phone]. Signed   by Julio Anderson RN Clinical , CRCR  Options provided:  -- Sepsis confirmed after study and was present on admission  -- Sepsis treated and resolved  -- No Sepsis, localized infection only  -- Sepsis was ruled out  -- SIRS only confirmed after study and was present on admission  -- Sepsis, not present on admission  -- Other - I will add my own diagnosis  -- Disagree - Not applicable / Not valid  -- Disagree - Clinically unable to determine / Unknown  -- Refer to Clinical Documentation Reviewer    PROVIDER RESPONSE TEXT:    After further study, sepsis was ruled out for this patient.     Query created by: Cha Kessler on 11/10/2022 8:59 AM      Electronically signed by:  Matt Waddell MD 11/10/2022 1:02 PM

## 2022-11-10 NOTE — CONSULTS
Brief Intervention and Referral to Treatment Summary    Patient was provided PHQ-9, AUDIT-C and DAST Screening:      PHQ-9 Score: 0  AUDIT-C Score:  1  DAST Score:  4    Patients substance use is considered     Moderate Risk    Patients depression is considered:     Minimal    Brief Education Was Provided    Patient was receptive    Brief Intervention Is Provided (Only for AUDIT-C or DAST)     Patient reports readiness to decrease and/or stop use and a plan was discussed     Recommendations/Referrals for Brief and/or Specialized Treatment Provided to Patient      Pt resides in [place] and was visiting her fiance when she used what she thought was cocaine but says was laced with Fentanyl.  Pt requests outpatient resources for [place].

## 2022-11-11 VITALS
WEIGHT: 129.3 LBS | SYSTOLIC BLOOD PRESSURE: 124 MMHG | DIASTOLIC BLOOD PRESSURE: 81 MMHG | RESPIRATION RATE: 17 BRPM | BODY MASS INDEX: 22.07 KG/M2 | TEMPERATURE: 98.5 F | HEART RATE: 74 BPM | HEIGHT: 64 IN | OXYGEN SATURATION: 96 %

## 2022-11-11 LAB
ANION GAP SERPL CALCULATED.3IONS-SCNC: 12 MEQ/L (ref 8–16)
BASE EXCESS MIXED: 2 MMOL/L (ref -2–3)
BUN BLDV-MCNC: 5 MG/DL (ref 7–22)
CALCIUM SERPL-MCNC: 9 MG/DL (ref 8.5–10.5)
CHLORIDE BLD-SCNC: 107 MEQ/L (ref 98–111)
CO2: 23 MEQ/L (ref 23–33)
COLLECTED BY:: ABNORMAL
CREAT SERPL-MCNC: 0.5 MG/DL (ref 0.4–1.2)
ERYTHROCYTE [DISTWIDTH] IN BLOOD BY AUTOMATED COUNT: 12.2 % (ref 11.5–14.5)
ERYTHROCYTE [DISTWIDTH] IN BLOOD BY AUTOMATED COUNT: 42.7 FL (ref 35–45)
GFR SERPL CREATININE-BSD FRML MDRD: > 60 ML/MIN/1.73M2
GLUCOSE BLD-MCNC: 88 MG/DL (ref 70–108)
HCO3, MIXED: 22 MMOL/L (ref 23–28)
HCT VFR BLD CALC: 33.1 % (ref 37–47)
HEMOGLOBIN: 10.6 GM/DL (ref 12–16)
MAGNESIUM: 1.7 MG/DL (ref 1.6–2.4)
MCH RBC QN AUTO: 30.5 PG (ref 26–33)
MCHC RBC AUTO-ENTMCNC: 32 GM/DL (ref 32.2–35.5)
MCV RBC AUTO: 95.4 FL (ref 81–99)
O2 SAT, MIXED: 100 %
PCO2, MIXED VENOUS: 22 MMHG (ref 41–51)
PH, MIXED: 7.61 (ref 7.31–7.41)
PLATELET # BLD: 261 THOU/MM3 (ref 130–400)
PMV BLD AUTO: 10.8 FL (ref 9.4–12.4)
PO2 MIXED: 179 MMHG (ref 25–40)
POTASSIUM REFLEX MAGNESIUM: 3.5 MEQ/L (ref 3.5–5.2)
RBC # BLD: 3.47 MILL/MM3 (ref 4.2–5.4)
SODIUM BLD-SCNC: 142 MEQ/L (ref 135–145)
WBC # BLD: 8.4 THOU/MM3 (ref 4.8–10.8)

## 2022-11-11 PROCEDURE — 80048 BASIC METABOLIC PNL TOTAL CA: CPT

## 2022-11-11 PROCEDURE — 36415 COLL VENOUS BLD VENIPUNCTURE: CPT

## 2022-11-11 PROCEDURE — 6370000000 HC RX 637 (ALT 250 FOR IP): Performed by: STUDENT IN AN ORGANIZED HEALTH CARE EDUCATION/TRAINING PROGRAM

## 2022-11-11 PROCEDURE — 85027 COMPLETE CBC AUTOMATED: CPT

## 2022-11-11 PROCEDURE — 2580000003 HC RX 258: Performed by: STUDENT IN AN ORGANIZED HEALTH CARE EDUCATION/TRAINING PROGRAM

## 2022-11-11 PROCEDURE — 6370000000 HC RX 637 (ALT 250 FOR IP)

## 2022-11-11 PROCEDURE — 83735 ASSAY OF MAGNESIUM: CPT

## 2022-11-11 PROCEDURE — 99239 HOSP IP/OBS DSCHRG MGMT >30: CPT | Performed by: FAMILY MEDICINE

## 2022-11-11 PROCEDURE — 82803 BLOOD GASES ANY COMBINATION: CPT

## 2022-11-11 PROCEDURE — 6360000002 HC RX W HCPCS: Performed by: STUDENT IN AN ORGANIZED HEALTH CARE EDUCATION/TRAINING PROGRAM

## 2022-11-11 RX ADMIN — SODIUM CHLORIDE: 9 INJECTION, SOLUTION INTRAVENOUS at 05:46

## 2022-11-11 RX ADMIN — PIPERACILLIN AND TAZOBACTAM 3375 MG: 3; .375 INJECTION, POWDER, FOR SOLUTION INTRAVENOUS at 05:47

## 2022-11-11 RX ADMIN — ACETAMINOPHEN 650 MG: 325 TABLET ORAL at 09:19

## 2022-11-11 ASSESSMENT — PAIN - FUNCTIONAL ASSESSMENT: PAIN_FUNCTIONAL_ASSESSMENT: ACTIVITIES ARE NOT PREVENTED

## 2022-11-11 ASSESSMENT — PAIN DESCRIPTION - FREQUENCY: FREQUENCY: INTERMITTENT

## 2022-11-11 ASSESSMENT — PAIN DESCRIPTION - LOCATION: LOCATION: HEAD

## 2022-11-11 ASSESSMENT — PAIN DESCRIPTION - PAIN TYPE: TYPE: ACUTE PAIN

## 2022-11-11 ASSESSMENT — PAIN DESCRIPTION - ONSET: ONSET: SUDDEN

## 2022-11-11 ASSESSMENT — PAIN DESCRIPTION - ORIENTATION: ORIENTATION: MID

## 2022-11-11 ASSESSMENT — PAIN SCALES - GENERAL
PAINLEVEL_OUTOF10: 3
PAINLEVEL_OUTOF10: 8

## 2022-11-11 ASSESSMENT — PAIN DESCRIPTION - DESCRIPTORS: DESCRIPTORS: ACHING

## 2022-11-11 NOTE — DISCHARGE SUMMARY
DISCHARGE SUMMARY      Patient Identification:   Ashley Roger   : 1990  MRN: [id number]   Account: [de-identified]      Patient's PCP: No primary care provider on file. Admit Date: 2022     Discharge Date: 2022      Admitting Physician: No admitting provider for patient encounter. Discharge Physician: Blanca Jain DO     Discharge Diagnoses: Active Hospital Problems    Diagnosis Date Noted    Acute respiratory failure with hypoxia ([address] [J96.01] 11/10/2022     Priority: Medium    Leukocytosis [D72.829] 11/10/2022     Priority: Medium    Toxic encephalopathy [G92.9] 11/10/2022     Priority: Medium    Lactic acidosis [E87.20] 11/10/2022     Priority: Medium    Respiratory acidosis [E87.29] 11/10/2022     Priority: Medium    Hyperammonemia ([address] [E72.20] 11/10/2022     Priority: Medium    Thrombocytosis [D75.839] 11/10/2022     Priority: Medium    Substance abuse ([address] [F19.10] 11/10/2022     Priority: Medium    Tobacco abuse [Z72.0] 11/10/2022     Priority: Medium    Normocytic anemia [D64.9] 11/10/2022     Priority: Medium    Opiate overdose ([address] 2022     Priority: Medium    Narcotic-induced respiratory depression [R06.89, T40.605A] 2022     Priority: Medium       The patient was seen and examined on day of discharge and this discharge summary is in conjunction with any daily progress note from day of discharge. Hospital Course:     Hospital Course:      \"Patient is a 36year old  Female with PMH Drug Abuse. She presented to [facility] ED after being found unresponsive by her boyfriend while driving to [facility] [place]. Patient's sister was at bedside to provide some history as patient is somnolent and easily falls back asleep. Per patient's sister, patient was recently admitted to a hospital at around [facility] [address] [facility] for overdose with cocaine and fentanyl. She was discharged around 1am this morning.  When patient went home patient's boyfriend decided to drive her to [facility] because her sister lived there and patient became unresponsive on the way there and thus presented to [facility] ED. Upon arrival patient was unresponsive with SpO2 17% on RA. She was given Narcan 2mg intranasally and bag masked. She soon became responsive to pain and placed on NRB now transitioned to nasal cannula. Patient states that her last use was around 9:30AM. She states she thought she snorted cocaine, but it may have had something else in it. She states she normally uses 1g a day 2-3 times a week. She otherwise uses cannabis. She currently denies any dizziness, lightheadedness, chest pain, shortness of breath, abdominal pain, nausea, vomiting, dysuria. Initial VS , RR 16, /100, SpO2 17% on RA (now 95% on 1L NC). Labs significant for Lactic acid 2.3, ammonia 70, WBC 30.1. UDS +Cocaine, Cannabinoids, and Fentanyl. LP performed in ED as well - PCR negative. ABG 7.26/54/275/24. CT Head and Cervical Spine negative for acute findings. CXR shows no acute findings. She was given 2L NS, Rocephin 2g x1, Vancomycin 1g x1, Benadryl 25mg x1, Lactulose 20g x1.\"     11/10/22:     - Narcan drip, tapering down by decreasing dosage by 0.1mg q 2 hrs  - Echo today to evaluate for endocarditis given pt is a drug user, also given leukocytosis, lactic acidosis ? Unknown etiology  - Continue IV Zosyn  - Nicotine patch  - Continue neuro checks q4 hrs  - Supportive care for opiate withdrawal symptoms  - Inpatient consult to addiction services\"    11/11/22 (day of discharge)    The patient was seen and examined at bedside today. She states she is feeling better. She is tolerating solids and liquids, voiding regularly and having normal BM. Narcan drip was discontinued last night 11/10/22. She denies states of unconsciousness, altered mentation, or decreased responsiveness.      IV Zosyn was discontinued as patient has been afebrile, normotensive, negative for tachycardia, normal WBC, normal respiratory rate, normal echo (no concern for endocarditis). Patient was vitally and hemodynamically stable upon discharge. She was AAO x 3 with normal mentation, normal insight, and normal thought process. She was encouraged to seek counseling and services for addiction, drug and alcohol use. Continue taking Suboxone as prescribed. Follow up with PCP in one week. Exam:     Vitals:  Vitals:    11/10/22 1945 11/11/22 0000 11/11/22 0345 11/11/22 0900   BP: (!) 135/90 (!) 135/98 115/78 124/81   Pulse: 77 50 64 74   Resp: 18 18 18 17   Temp: 98.9 °F (37.2 °C) 97.9 °F (36.6 °C) 98.4 °F (36.9 °C) 98.5 °F (36.9 °C)   TempSrc: Oral Oral Oral Oral   SpO2: 98% 98% 98% 96%   Weight:       Height:         Weight: Weight: 129 lb 4.8 oz (58.7 kg)     24 hour intake/output:No intake or output data in the 24 hours ending 11/11/22 2001      General appearance:  No apparent distress, appears stated age and cooperative. HEENT:  Normal cephalic, atraumatic without obvious deformity. Conjunctivae/corneas clear. Neck: Supple, with full range of motion. No jugular venous distention. Trachea midline. Respiratory:  Normal respiratory effort. Clear to auscultation, bilaterally without Rales/Wheezes/Rhonchi. Cardiovascular:  Regular rate and rhythm with normal S1/S2 without murmurs, rubs or gallops. Abdomen: Soft, non-tender, non-distended  Musculoskeletal:  No clubbing, cyanosis or edema bilaterally. Full range of motion without deformity. Skin: Skin color, texture, turgor normal.  No rashes or lesions. Neurologic:  Neurovascularly intact without any focal sensory/motor deficits. Psychiatric:  Alert and oriented, thought content appropriate, normal insight  Capillary Refill: Brisk,< 3 seconds   Peripheral Pulses: +2 palpable, equal bilaterally       Labs:  For convenience and continuity at follow-up the following most recent labs are provided:      CBC:    Lab Results   Component Value Activity: activity as tolerated  Diet: No diet orders on file      Follow-up visits:   [facility] physician  [phone]  Schedule an appointment as soon as possible for a visit in 1 week(s)  call this number to make an appointment in a wekk or as soon as they can fit you in. Discharge Medications:        Medication List        CONTINUE taking these medications      Suboxone 8-2 MG Film SL film  Generic drug: buprenorphine-naloxone              Time Spent on discharge is more than 20 minutes in the examination, evaluation, counseling and review of medications and discharge plan. Signed: Thank you No primary care provider on file. for the opportunity to be involved in this patient's care.     Electronically signed by Dick Strickland DO on 11/11/2022 at 8:01 PM

## 2022-11-11 NOTE — PLAN OF CARE
Problem: Discharge Planning  Goal: Discharge to home or other facility with appropriate resources  11/11/2022 0136 by Maura Daniels RN  Outcome: Progressing  11/11/2022 0135 by Maura Daniels RN  Outcome: Progressing  Flowsheets (Taken 11/11/2022 0135)  Discharge to home or other facility with appropriate resources:   Identify barriers to discharge with patient and caregiver   Identify discharge learning needs (meds, wound care, etc)     Problem: Safety - Adult  Goal: Free from fall injury  11/11/2022 0136 by Maura Daniels RN  Outcome: Progressing  11/11/2022 0135 by Maura Daniels RN  Outcome: Progressing  Note: Bed locked & in low position, call light in reach, side-rails up x2, bed/chair alarm utilized, non-slip socks on when ambulating, reminded patient to use call light to call for assistance. Problem: Pain  Goal: Verbalizes/displays adequate comfort level or baseline comfort level  Outcome: Progressing  Flowsheets (Taken 11/11/2022 0136)  Verbalizes/displays adequate comfort level or baseline comfort level:   Encourage patient to monitor pain and request assistance   Assess pain using appropriate pain scale   Administer analgesics based on type and severity of pain and evaluate response   Implement non-pharmacological measures as appropriate and evaluate response  Note: Ongoing assessment & interventions provided throughout shift. Reminded patient to report any pain, pressure, or shortness of breath to the nurse. Pain medications provided per physician's orders. Care plan reviewed with patient. Patient verbalizes understanding of the care plan and contributed to goal setting.

## 2022-11-11 NOTE — DISCHARGE INSTRUCTIONS
Follow up with your PCP in one week. Encourage patient to see addiction services for history of drug use. Encourage tobacco cessation.

## 2022-11-11 NOTE — CARE COORDINATION

## 2022-11-11 NOTE — CONSULTS
Pt declined to complete AOD consult. Reports nothing will help her here as she will be returning to [place].

## 2022-11-14 LAB
ANAEROBIC CULTURE: NORMAL
BLOOD CULTURE, ROUTINE: NORMAL
BLOOD CULTURE, ROUTINE: NORMAL
CSF CULTURE: NORMAL